# Patient Record
Sex: MALE | Race: WHITE | Employment: FULL TIME | URBAN - METROPOLITAN AREA
[De-identification: names, ages, dates, MRNs, and addresses within clinical notes are randomized per-mention and may not be internally consistent; named-entity substitution may affect disease eponyms.]

---

## 2018-02-06 ENCOUNTER — HOSPITAL ENCOUNTER (OUTPATIENT)
Dept: GENERAL RADIOLOGY | Age: 33
Discharge: HOME | End: 2018-02-06
Attending: OTOLARYNGOLOGY
Payer: COMMERCIAL

## 2018-02-06 DIAGNOSIS — R50.9 FEVER, UNSPECIFIED FEVER CAUSE: ICD-10-CM

## 2018-02-06 DIAGNOSIS — R05.9 COUGH: ICD-10-CM

## 2018-02-06 PROCEDURE — 71046 X-RAY EXAM CHEST 2 VIEWS: CPT | Performed by: OTOLARYNGOLOGY

## 2018-11-16 ENCOUNTER — ORDER TRANSCRIPTION (OUTPATIENT)
Dept: SLEEP CENTER | Age: 33
End: 2018-11-16

## 2018-11-16 DIAGNOSIS — G47.33 OSA (OBSTRUCTIVE SLEEP APNEA): Primary | ICD-10-CM

## 2018-11-27 ENCOUNTER — OFFICE VISIT (OUTPATIENT)
Dept: SLEEP CENTER | Age: 33
End: 2018-11-27
Attending: FAMILY MEDICINE
Payer: COMMERCIAL

## 2018-11-27 DIAGNOSIS — Z76.89 SLEEP CONCERN: Primary | ICD-10-CM

## 2018-11-27 PROCEDURE — 95810 POLYSOM 6/> YRS 4/> PARAM: CPT

## 2018-12-02 NOTE — PROCEDURES
[facility]  Accredited by the [employer] ([employer])    PATIENT'S NAME: Anyi Byrd PHYSICIAN: Gale Rosen MD   REFERRING PHYSICIAN: Gale Rosen MD   PATIENT ACCOUNT #: [de-identified] LOCATION: [facility] Center   [address] [place] [address] respiratory events. PLAN:    1. The patient is an appropriate candidate for CPAP titration, and CPAP titration is advised. 2.   Weight reduction. 3.   Avoidance of respiratory depressants, including sedatives and alcohol near bedtime.   4.   The leonid

## 2018-12-07 ENCOUNTER — ORDER TRANSCRIPTION (OUTPATIENT)
Dept: SLEEP CENTER | Age: 33
End: 2018-12-07

## 2018-12-07 DIAGNOSIS — G47.33 OSA (OBSTRUCTIVE SLEEP APNEA): Primary | ICD-10-CM

## 2018-12-10 ENCOUNTER — OFFICE VISIT (OUTPATIENT)
Dept: SLEEP CENTER | Age: 33
End: 2018-12-10
Attending: FAMILY MEDICINE
Payer: COMMERCIAL

## 2018-12-10 DIAGNOSIS — G47.33 OSA (OBSTRUCTIVE SLEEP APNEA): ICD-10-CM

## 2018-12-10 DIAGNOSIS — Z76.89 SLEEP CONCERN: Primary | ICD-10-CM

## 2018-12-10 PROCEDURE — 95811 POLYSOM 6/>YRS CPAP 4/> PARM: CPT

## 2019-12-19 ENCOUNTER — HOSPITAL ENCOUNTER (OUTPATIENT)
Age: 34
Discharge: HOME | End: 2019-12-19
Payer: COMMERCIAL

## 2019-12-19 VITALS
TEMPERATURE: 98 F | DIASTOLIC BLOOD PRESSURE: 71 MMHG | WEIGHT: 250 LBS | OXYGEN SATURATION: 98 % | RESPIRATION RATE: 18 BRPM | HEART RATE: 98 BPM | SYSTOLIC BLOOD PRESSURE: 143 MMHG

## 2019-12-19 DIAGNOSIS — H65.92 LEFT NON-SUPPURATIVE OTITIS MEDIA: ICD-10-CM

## 2019-12-19 DIAGNOSIS — H60.312 ACUTE DIFFUSE OTITIS EXTERNA OF LEFT EAR: Primary | ICD-10-CM

## 2019-12-19 PROCEDURE — 99213 OFFICE O/P EST LOW 20 MIN: CPT

## 2019-12-19 PROCEDURE — 99204 OFFICE O/P NEW MOD 45 MIN: CPT

## 2019-12-19 RX ORDER — AMOXICILLIN 875 MG/1
875 TABLET, COATED ORAL 2 TIMES DAILY
Qty: 20 TABLET | Refills: 0 | Status: SHIPPED | OUTPATIENT
Start: 2019-12-19 | End: 2019-12-29

## 2019-12-19 RX ORDER — OFLOXACIN 3 MG/ML
10 SOLUTION AURICULAR (OTIC) DAILY
Qty: 1 BOTTLE | Refills: 0 | Status: SHIPPED | OUTPATIENT
Start: 2019-12-19 | End: 2019-12-26

## 2019-12-19 NOTE — ED PROVIDER NOTES
Patient presents with:  Ear Problem Pain      HPI:     Marylee Courser is a 36year old male who presents with a chief complaint of left ear pain that started yesterday. The patient states he has been swimming recently. He denies any drainage from the ear.   Johan Parks Emotionally abused: Not on file        Physically abused: Not on file        Forced sexual activity: Not on file    Other Topics      Concerns:        Not on file    Social History Narrative      Not on file        ROS:   Positive for stated complaint: lef doctor. Diagnosis:    ICD-10-CM    1. Acute diffuse otitis externa of left ear H60.312    2. Left non-suppurative otitis media H65.92        All results reviewed and discussed with patient.   See AVS for detailed discharge instructions for your condition t

## 2020-08-26 ENCOUNTER — LAB ENCOUNTER (OUTPATIENT)
Dept: LAB | Age: 35
End: 2020-08-26
Attending: FAMILY MEDICINE
Payer: COMMERCIAL

## 2020-08-26 DIAGNOSIS — Z00.00 ROUTINE GENERAL MEDICAL EXAMINATION AT A HEALTH CARE FACILITY: Primary | ICD-10-CM

## 2020-08-26 LAB
ALBUMIN SERPL-MCNC: 4.1 G/DL (ref 3.4–5)
ALBUMIN/GLOB SERPL: 0.9 {RATIO} (ref 1–2)
ALP LIVER SERPL-CCNC: 67 U/L (ref 45–117)
ALT SERPL-CCNC: 63 U/L (ref 16–61)
ANION GAP SERPL CALC-SCNC: 6 MMOL/L (ref 0–18)
AST SERPL-CCNC: 37 U/L (ref 15–37)
BASOPHILS # BLD AUTO: 0.04 X10(3) UL (ref 0–0.2)
BASOPHILS NFR BLD AUTO: 0.4 %
BILIRUB SERPL-MCNC: 1.1 MG/DL (ref 0.1–2)
BUN BLD-MCNC: 23 MG/DL (ref 7–18)
BUN/CREAT SERPL: 19.3 (ref 10–20)
CALCIUM BLD-MCNC: 9.5 MG/DL (ref 8.5–10.1)
CHLORIDE SERPL-SCNC: 101 MMOL/L (ref 98–112)
CHOLEST SMN-MCNC: 198 MG/DL (ref ?–200)
CO2 SERPL-SCNC: 27 MMOL/L (ref 21–32)
CREAT BLD-MCNC: 1.19 MG/DL (ref 0.7–1.3)
DEPRECATED RDW RBC AUTO: 39.2 FL (ref 35.1–46.3)
EOSINOPHIL # BLD AUTO: 0.24 X10(3) UL (ref 0–0.7)
EOSINOPHIL NFR BLD AUTO: 2.7 %
ERYTHROCYTE [DISTWIDTH] IN BLOOD BY AUTOMATED COUNT: 13.2 % (ref 11–15)
GLOBULIN PLAS-MCNC: 4.7 G/DL (ref 2.8–4.4)
GLUCOSE BLD-MCNC: 96 MG/DL (ref 70–99)
HCT VFR BLD AUTO: 46.7 % (ref 39–53)
HDLC SERPL-MCNC: 39 MG/DL (ref 40–59)
HGB BLD-MCNC: 15.7 G/DL (ref 13–17.5)
IMM GRANULOCYTES # BLD AUTO: 0.03 X10(3) UL (ref 0–1)
IMM GRANULOCYTES NFR BLD: 0.3 %
LDLC SERPL CALC-MCNC: 89 MG/DL (ref ?–100)
LYMPHOCYTES # BLD AUTO: 3.21 X10(3) UL (ref 1–4)
LYMPHOCYTES NFR BLD AUTO: 35.5 %
M PROTEIN MFR SERPL ELPH: 8.8 G/DL (ref 6.4–8.2)
MCH RBC QN AUTO: 27.8 PG (ref 26–34)
MCHC RBC AUTO-ENTMCNC: 33.6 G/DL (ref 31–37)
MCV RBC AUTO: 82.7 FL (ref 80–100)
MONOCYTES # BLD AUTO: 0.68 X10(3) UL (ref 0.1–1)
MONOCYTES NFR BLD AUTO: 7.5 %
NEUTROPHILS # BLD AUTO: 4.83 X10 (3) UL (ref 1.5–7.7)
NEUTROPHILS # BLD AUTO: 4.83 X10(3) UL (ref 1.5–7.7)
NEUTROPHILS NFR BLD AUTO: 53.6 %
NONHDLC SERPL-MCNC: 159 MG/DL (ref ?–130)
OSMOLALITY SERPL CALC.SUM OF ELEC: 282 MOSM/KG (ref 275–295)
PATIENT FASTING Y/N/NP: YES
PATIENT FASTING Y/N/NP: YES
PLATELET # BLD AUTO: 275 10(3)UL (ref 150–450)
POTASSIUM SERPL-SCNC: 4.2 MMOL/L (ref 3.5–5.1)
RBC # BLD AUTO: 5.65 X10(6)UL (ref 4.3–5.7)
SODIUM SERPL-SCNC: 134 MMOL/L (ref 136–145)
TRIGL SERPL-MCNC: 350 MG/DL (ref 30–149)
TSI SER-ACNC: 3.23 MIU/ML (ref 0.36–3.74)
VLDLC SERPL CALC-MCNC: 70 MG/DL (ref 0–30)
WBC # BLD AUTO: 9 X10(3) UL (ref 4–11)

## 2020-08-26 PROCEDURE — 84443 ASSAY THYROID STIM HORMONE: CPT

## 2020-08-26 PROCEDURE — 80061 LIPID PANEL: CPT

## 2020-08-26 PROCEDURE — 85025 COMPLETE CBC W/AUTO DIFF WBC: CPT

## 2020-08-26 PROCEDURE — 36415 COLL VENOUS BLD VENIPUNCTURE: CPT

## 2020-08-26 PROCEDURE — 80053 COMPREHEN METABOLIC PANEL: CPT

## 2020-10-07 ENCOUNTER — HOSPITAL ENCOUNTER (OUTPATIENT)
Dept: ULTRASOUND IMAGING | Age: 35
Discharge: HOME | End: 2020-10-07
Attending: FAMILY MEDICINE
Payer: COMMERCIAL

## 2020-10-07 DIAGNOSIS — L72.9 CYST OF SKIN: ICD-10-CM

## 2020-10-07 PROCEDURE — 76536 US EXAM OF HEAD AND NECK: CPT | Performed by: FAMILY MEDICINE

## 2021-05-12 ENCOUNTER — HOSPITAL ENCOUNTER (OUTPATIENT)
Age: 36
Discharge: HOME | End: 2021-05-12
Payer: COMMERCIAL

## 2021-05-12 VITALS
OXYGEN SATURATION: 95 % | RESPIRATION RATE: 20 BRPM | HEART RATE: 101 BPM | TEMPERATURE: 97 F | SYSTOLIC BLOOD PRESSURE: 146 MMHG | WEIGHT: 255 LBS | DIASTOLIC BLOOD PRESSURE: 64 MMHG

## 2021-05-12 DIAGNOSIS — J06.9 UPPER RESPIRATORY VIRUS: Primary | ICD-10-CM

## 2021-05-12 PROCEDURE — 99213 OFFICE O/P EST LOW 20 MIN: CPT

## 2021-05-12 PROCEDURE — 87880 STREP A ASSAY W/OPTIC: CPT

## 2021-05-12 RX ORDER — DULOXETIN HYDROCHLORIDE 30 MG/1
30 CAPSULE, DELAYED RELEASE ORAL DAILY
COMMUNITY
Start: 2021-02-05

## 2021-05-12 RX ORDER — PREDNISONE 20 MG/1
60 TABLET ORAL ONCE
Status: COMPLETED | OUTPATIENT
Start: 2021-05-12 | End: 2021-05-12

## 2021-05-12 RX ORDER — PREDNISONE 20 MG/1
40 TABLET ORAL DAILY
Qty: 10 TABLET | Refills: 0 | Status: SHIPPED | OUTPATIENT
Start: 2021-05-12 | End: 2021-05-17

## 2021-05-12 RX ORDER — LISINOPRIL AND HYDROCHLOROTHIAZIDE 25; 20 MG/1; MG/1
1 TABLET ORAL DAILY
COMMUNITY
Start: 2021-02-05

## 2021-05-12 NOTE — ED INITIAL ASSESSMENT (HPI)
Patient with sore throat, congestion, and dry cough. Patient's daughter dx with Strep on Monday. Patient's wife was tested for strep and COVID this week and was negative. Patient is fully vaccinated for COVID.  Patient reports chest tightness and needing to

## 2021-05-12 NOTE — ED PROVIDER NOTES
Patient presents with:  Cough/URI      HPI:     Rigo Trejo is a 30year old male who presents for evaluation and management of a chief complaint of sore throat, cough, and congestion for the past 2 days.   His daughter is currently being treated for s Food in the Last Year:   Transportation Needs:       Lack of Transportation (Medical):       Lack of Transportation (Non-Medical):   Physical Activity:       Days of Exercise per Week:       Minutes of Exercise per Session:   Stress:       Feeling of Stres days.          Dispense:  10 tablet          Refill:  0      Labs performed this visit:  Recent Results (from the past 10 hour(s))   POCT RAPID STREP    Collection Time: 05/12/21 11:35 AM   Result Value Ref Range    POCT Rapid Strep Negative Negative

## 2021-07-13 ENCOUNTER — HOSPITAL ENCOUNTER (OUTPATIENT)
Age: 36
Discharge: HOME | End: 2021-07-13
Payer: COMMERCIAL

## 2021-07-13 VITALS
OXYGEN SATURATION: 97 % | HEART RATE: 99 BPM | DIASTOLIC BLOOD PRESSURE: 68 MMHG | RESPIRATION RATE: 18 BRPM | SYSTOLIC BLOOD PRESSURE: 132 MMHG | TEMPERATURE: 98 F

## 2021-07-13 DIAGNOSIS — J45.909 REACTIVE AIRWAY DISEASE WITHOUT COMPLICATION, UNSPECIFIED ASTHMA SEVERITY, UNSPECIFIED WHETHER PERSISTENT: ICD-10-CM

## 2021-07-13 DIAGNOSIS — B34.9 VIRAL ILLNESS: Primary | ICD-10-CM

## 2021-07-13 PROCEDURE — 99213 OFFICE O/P EST LOW 20 MIN: CPT

## 2021-07-13 RX ORDER — ALBUTEROL SULFATE 90 UG/1
2 AEROSOL, METERED RESPIRATORY (INHALATION) EVERY 4 HOURS PRN
Qty: 6.7 G | Refills: 0 | Status: SHIPPED | OUTPATIENT
Start: 2021-07-13 | End: 2021-08-12

## 2021-07-13 RX ORDER — CODEINE PHOSPHATE AND GUAIFENESIN 10; 100 MG/5ML; MG/5ML
5 SOLUTION ORAL EVERY 6 HOURS PRN
Qty: 118 ML | Refills: 0 | Status: SHIPPED | OUTPATIENT
Start: 2021-07-13

## 2021-07-13 RX ORDER — PREDNISONE 20 MG/1
60 TABLET ORAL DAILY
Qty: 15 TABLET | Refills: 0 | Status: SHIPPED | OUTPATIENT
Start: 2021-07-13 | End: 2021-07-18

## 2021-07-13 RX ORDER — PREDNISONE 20 MG/1
60 TABLET ORAL ONCE
Status: COMPLETED | OUTPATIENT
Start: 2021-07-13 | End: 2021-07-13

## 2021-07-13 NOTE — ED INITIAL ASSESSMENT (HPI)
Patient with nasal congestion cough and sneezing for the last 2-3 days. Denies fevers, chills, body aches. Taking otc cough/colds medications without relief in symptoms.

## 2021-07-13 NOTE — ED PROVIDER NOTES
Patient Seen in: [facility]      History   Patient presents with:  Cough/URI    Stated Complaint: Cough sinus    HPI/Subjective:   Oliver Cortez is a 30year-old male presenting with nasal congestion, cough, chest tightness for the past 2 (Room air)       Current:/68   Pulse 99   Temp 97.8 °F (36.6 °C) (Temporal)   Resp 18   SpO2 97%         Physical Exam  Vitals and nursing note reviewed. Constitutional:       Appearance: Normal appearance.    HENT:      Head: Normocephalic and atra albuterol inhaler. Patient advised to follow-up with his primary care. Go to the ED for new or worsening symptoms. Patient is afebrile, nontoxic in appearance without signs of clinical deterioration.  Patient has no evidence of any emergent medical con

## 2022-02-19 ENCOUNTER — HOSPITAL ENCOUNTER (OUTPATIENT)
Age: 37
Discharge: HOME | End: 2022-02-19
Attending: EMERGENCY MEDICINE
Payer: COMMERCIAL

## 2022-02-19 VITALS
RESPIRATION RATE: 18 BRPM | HEIGHT: 67 IN | SYSTOLIC BLOOD PRESSURE: 146 MMHG | TEMPERATURE: 98 F | OXYGEN SATURATION: 98 % | BODY MASS INDEX: 40.81 KG/M2 | DIASTOLIC BLOOD PRESSURE: 74 MMHG | HEART RATE: 98 BPM | WEIGHT: 260 LBS

## 2022-02-19 DIAGNOSIS — J20.9 BRONCHITIS WITH BRONCHOSPASM: Primary | ICD-10-CM

## 2022-02-19 PROCEDURE — 99213 OFFICE O/P EST LOW 20 MIN: CPT

## 2022-02-19 RX ORDER — ALBUTEROL SULFATE 90 UG/1
2 AEROSOL, METERED RESPIRATORY (INHALATION) EVERY 4 HOURS PRN
Qty: 18 G | Refills: 0 | Status: SHIPPED | OUTPATIENT
Start: 2022-02-19 | End: 2022-03-21

## 2022-02-19 RX ORDER — PREDNISONE 20 MG/1
40 TABLET ORAL DAILY
Qty: 10 TABLET | Refills: 0 | Status: SHIPPED | OUTPATIENT
Start: 2022-02-19 | End: 2022-02-24

## 2022-02-19 RX ORDER — PREDNISONE 20 MG/1
40 TABLET ORAL ONCE
Status: COMPLETED | OUTPATIENT
Start: 2022-02-19 | End: 2022-02-19

## 2022-02-19 NOTE — ED INITIAL ASSESSMENT (HPI)
2 days of congestion and cough. No fever. Presents with chest tightness and wheezing. Declines covid testing.

## 2022-03-14 ENCOUNTER — HOSPITAL ENCOUNTER (OUTPATIENT)
Age: 37
Discharge: HOME | End: 2022-03-14
Payer: COMMERCIAL

## 2022-03-14 VITALS
DIASTOLIC BLOOD PRESSURE: 75 MMHG | OXYGEN SATURATION: 97 % | SYSTOLIC BLOOD PRESSURE: 136 MMHG | HEART RATE: 100 BPM | RESPIRATION RATE: 20 BRPM | TEMPERATURE: 98 F

## 2022-03-14 DIAGNOSIS — J01.90 ACUTE SINUSITIS, RECURRENCE NOT SPECIFIED, UNSPECIFIED LOCATION: Primary | ICD-10-CM

## 2022-03-14 PROCEDURE — 99213 OFFICE O/P EST LOW 20 MIN: CPT

## 2022-03-14 RX ORDER — AMOXICILLIN AND CLAVULANATE POTASSIUM 875; 125 MG/1; MG/1
1 TABLET, FILM COATED ORAL 2 TIMES DAILY
Qty: 20 TABLET | Refills: 0 | Status: SHIPPED | OUTPATIENT
Start: 2022-03-14 | End: 2022-03-24

## 2022-03-14 NOTE — ED INITIAL ASSESSMENT (HPI)
PATIENT ARRIVED AMBULATORY TO ROOM. PATIENT STATES HE WAS SEEN IN THE IC ON 2/19 FOR SIMILAR SYMPTOMS. PATIENT STATES THE NASAL CONGESTION HAS STILL NOT RESOLVED. NOW C/O BILATERAL EAR PAIN. THE LEFT IS WORSE THAN THE RIGHT. NO FEVERS.

## 2022-03-23 ENCOUNTER — ORDER TRANSCRIPTION (OUTPATIENT)
Dept: ADMINISTRATIVE | Facility: HOSPITAL | Age: 37
End: 2022-03-23

## 2022-03-26 ENCOUNTER — HOSPITAL ENCOUNTER (OUTPATIENT)
Age: 37
Discharge: HOME | End: 2022-03-26
Payer: COMMERCIAL

## 2022-03-26 ENCOUNTER — APPOINTMENT (OUTPATIENT)
Dept: ULTRASOUND IMAGING | Age: 37
End: 2022-03-26
Attending: NURSE PRACTITIONER
Payer: COMMERCIAL

## 2022-03-26 ENCOUNTER — APPOINTMENT (OUTPATIENT)
Dept: GENERAL RADIOLOGY | Age: 37
End: 2022-03-26
Attending: NURSE PRACTITIONER
Payer: COMMERCIAL

## 2022-03-26 VITALS
RESPIRATION RATE: 18 BRPM | WEIGHT: 260 LBS | HEIGHT: 67 IN | SYSTOLIC BLOOD PRESSURE: 156 MMHG | DIASTOLIC BLOOD PRESSURE: 66 MMHG | OXYGEN SATURATION: 95 % | HEART RATE: 108 BPM | BODY MASS INDEX: 40.81 KG/M2 | TEMPERATURE: 98 F

## 2022-03-26 DIAGNOSIS — M79.672 LEFT FOOT PAIN: Primary | ICD-10-CM

## 2022-03-26 PROCEDURE — 73630 X-RAY EXAM OF FOOT: CPT | Performed by: NURSE PRACTITIONER

## 2022-03-26 PROCEDURE — 93971 EXTREMITY STUDY: CPT | Performed by: NURSE PRACTITIONER

## 2022-03-26 PROCEDURE — 99214 OFFICE O/P EST MOD 30 MIN: CPT

## 2022-03-26 PROCEDURE — 99213 OFFICE O/P EST LOW 20 MIN: CPT

## 2022-03-26 RX ORDER — TRAMADOL HYDROCHLORIDE 50 MG/1
TABLET ORAL EVERY 6 HOURS PRN
Qty: 10 TABLET | Refills: 0 | Status: SHIPPED | OUTPATIENT
Start: 2022-03-26 | End: 2022-03-31

## 2022-03-26 RX ORDER — IBUPROFEN 400 MG/1
800 TABLET ORAL ONCE
Status: DISCONTINUED | OUTPATIENT
Start: 2022-03-26 | End: 2022-03-26

## 2022-03-26 NOTE — ED INITIAL ASSESSMENT (HPI)
Pt presents with Left foot pain x 48 hours. No trauma, no injury. Pain to dorsal aspect of foot into toes. Pt took Motrin today at 730a    Pt seen here 10 days ago for left ear pain, requesting left ear to be re-evaluated.

## 2022-06-01 ENCOUNTER — HOSPITAL ENCOUNTER (OUTPATIENT)
Age: 37
Discharge: HOME | End: 2022-06-01
Attending: EMERGENCY MEDICINE
Payer: COMMERCIAL

## 2022-06-01 VITALS
SYSTOLIC BLOOD PRESSURE: 126 MMHG | DIASTOLIC BLOOD PRESSURE: 75 MMHG | HEART RATE: 102 BPM | OXYGEN SATURATION: 96 % | TEMPERATURE: 98 F | RESPIRATION RATE: 24 BRPM

## 2022-06-01 DIAGNOSIS — J20.9 BRONCHITIS WITH BRONCHOSPASM: Primary | ICD-10-CM

## 2022-06-01 LAB — SARS-COV-2 RNA RESP QL NAA+PROBE: NOT DETECTED

## 2022-06-01 PROCEDURE — 99213 OFFICE O/P EST LOW 20 MIN: CPT

## 2022-06-01 RX ORDER — PREDNISONE 20 MG/1
40 TABLET ORAL ONCE
Status: COMPLETED | OUTPATIENT
Start: 2022-06-01 | End: 2022-06-01

## 2022-06-01 RX ORDER — PREDNISONE 20 MG/1
40 TABLET ORAL DAILY
Qty: 10 TABLET | Refills: 0 | Status: SHIPPED | OUTPATIENT
Start: 2022-06-01 | End: 2022-06-06

## 2022-06-20 ENCOUNTER — OFFICE VISIT (OUTPATIENT)
Dept: SURGERY | Facility: CLINIC | Age: 37
End: 2022-06-20
Payer: COMMERCIAL

## 2022-06-20 VITALS
DIASTOLIC BLOOD PRESSURE: 80 MMHG | WEIGHT: 277.19 LBS | HEIGHT: 68.3 IN | HEART RATE: 104 BPM | OXYGEN SATURATION: 97 % | BODY MASS INDEX: 42.01 KG/M2 | SYSTOLIC BLOOD PRESSURE: 132 MMHG

## 2022-06-20 DIAGNOSIS — E66.01 MORBID OBESITY WITH BMI OF 40.0-44.9, ADULT (HCC): ICD-10-CM

## 2022-06-20 DIAGNOSIS — G47.33 OSA ON CPAP: Primary | ICD-10-CM

## 2022-06-20 DIAGNOSIS — F41.9 ANXIETY: ICD-10-CM

## 2022-06-20 DIAGNOSIS — I10 HYPERTENSION, UNSPECIFIED TYPE: ICD-10-CM

## 2022-06-20 DIAGNOSIS — R74.01 ELEVATED ALT MEASUREMENT: ICD-10-CM

## 2022-06-20 DIAGNOSIS — Z99.89 OSA ON CPAP: Primary | ICD-10-CM

## 2022-06-20 DIAGNOSIS — E78.2 HYPERCHOLESTEROLEMIA WITH HYPERTRIGLYCERIDEMIA: ICD-10-CM

## 2022-06-20 DIAGNOSIS — R63.5 WEIGHT GAIN: ICD-10-CM

## 2022-06-20 PROCEDURE — 3075F SYST BP GE 130 - 139MM HG: CPT | Performed by: NURSE PRACTITIONER

## 2022-06-20 PROCEDURE — 3008F BODY MASS INDEX DOCD: CPT | Performed by: NURSE PRACTITIONER

## 2022-06-20 PROCEDURE — 3079F DIAST BP 80-89 MM HG: CPT | Performed by: NURSE PRACTITIONER

## 2022-06-20 PROCEDURE — 99204 OFFICE O/P NEW MOD 45 MIN: CPT | Performed by: NURSE PRACTITIONER

## 2022-06-20 RX ORDER — ALPRAZOLAM 0.5 MG/1
0.5 TABLET ORAL DAILY PRN
COMMUNITY
Start: 2022-04-18

## 2022-06-20 RX ORDER — ALBUTEROL SULFATE 90 UG/1
AEROSOL, METERED RESPIRATORY (INHALATION) EVERY 6 HOURS PRN
COMMUNITY

## 2022-06-20 RX ORDER — TOPIRAMATE 25 MG/1
25 TABLET ORAL 2 TIMES DAILY
Qty: 60 TABLET | Refills: 1 | Status: SHIPPED | OUTPATIENT
Start: 2022-06-20

## 2022-06-20 NOTE — PATIENT INSTRUCTIONS
Proceed with stress test planned in July. Continue CBT: Binge Eating. Start 25 mg topiramate in the evening for cravings and to assist weight loss, increase to twice a day as tolerated. Daily Calories:  Track food and beverage intake daily on a phone cheo: My Fitness Pal, Carb Manager, Lose It, My Net Diary   120-174 lbs: 1200 calories/day. 175-219 lbs: 1500 calories/day. 220-249 lbs: 1800 calories/day. 250 lbs or more: 2,000 calories/day. Record food and drink intake on a written log or phone cheo:   Aim for  grams of carbs/day. Aim for a whole, plant strong, low glycemic index dietary pattern. Aim for protein + produce at each meal time. Keep meals between 7 am and 7 pm.    Aim for 3 healthy meals a day with 1-2 healthy snacks as needed. Daily- Aim for:  2 fruits: tomato, avocado, apples, oranges, berries   4 handfuls non-starchy vegetables: greens, peppers, onion, garlic, broccoli, cauliflower, asparagus, brussels sprouts   2 starches: real potato (sweet, white), green beans, carrots, corn, beans, lentils, chickpeas, quinoa, cous cous  3 protein per day: nuts, seeds, plants (lentils, chickpeas, beans), chicken, fish, seafood, turkey, pork, red meat (limited)  Aim for 2 servings healthy fats per day: olives, fatty fish, olive oil, seeds, nuts, avocado, coconut oil. Breakfast ideas:  1. Fruit and nuts/seeds. 2. Eggs scrambled with vegetables, cottage cheese. 3. Oatmeal (stovetop), cinnamon, 2 tbsp flaxseed, berries. 4. Protein shake: 1-2 scoops protein powder, shake with ice and water. Aim for protein and vegetables for lunch and dinner. Healthy Plate method:   1/2 vegetables, 1/4 protein, 1/4 healthy starch (may decrease this portion). Snacks:  Raw vegetables and hummus, apples and peanut butter, nuts, seeds, fruit, pecans with organic honey drizzled. 1. Do a house cleanse, remove unhealthy foods from the house.    2. Aim to eat a whole, plant strong, low glycemic index diet. 3. Focus on the quality of your diet. 4. Get in the habit of recording everything you eat and drink using a food log.  5. Write down all your meals/drinks to stay accountable for what you eat and drink. 6. Find a regular pattern for meals. Aim for 3 healthy meals a day with 1-2 healthy snacks. 7. Plan when, where, and what you will eat at each meal in advance to make sure you do not go too many hours without eating. 8. Include lean protein throughout the day to help steady your appetite. 9. Eat at least 4 servings of vegetables and 2 servings for fruits each day. 10. Add fiber to slow down digestion and keep you full longer. 11. Cut out sugar sweetened beverages. 12. Avoid highly processed carbohydrates. 13. Use the healthy plate method as a reference: Lunch & Dinner plate: 1/2 vegetables (and some fruit), 1/4 protein, 1/4 healthy starch (may decrease this portion). 14. Aim to lose 1 to 2 lbs per week. 15. Become more physically active. Aim for 150 minutes of moderate/vigorous activity per week. 16. Aim to sleep 7-8 hours per night. 17. Stress less. Meditate. 25. Connect with others, loneliness can contribute to disease. 19. Aim to drink at least 64 oz of water a day, you can increase this to half your body weight in ounces/day. Get labs. Meet with dietician. Return to clinic in 6 to 8 weeks.

## 2022-06-21 ENCOUNTER — HOSPITAL ENCOUNTER (OUTPATIENT)
Age: 37
Discharge: HOME | End: 2022-06-21
Payer: COMMERCIAL

## 2022-06-21 VITALS
OXYGEN SATURATION: 95 % | RESPIRATION RATE: 22 BRPM | BODY MASS INDEX: 41.68 KG/M2 | SYSTOLIC BLOOD PRESSURE: 136 MMHG | WEIGHT: 275 LBS | DIASTOLIC BLOOD PRESSURE: 68 MMHG | TEMPERATURE: 99 F | HEART RATE: 110 BPM | HEIGHT: 68 IN

## 2022-06-21 DIAGNOSIS — U07.1 COVID-19 VIRUS INFECTION: Primary | ICD-10-CM

## 2022-06-21 PROCEDURE — 99213 OFFICE O/P EST LOW 20 MIN: CPT

## 2022-06-21 RX ORDER — NIRMATRELVIR AND RITONAVIR 300-100 MG
KIT ORAL
Qty: 30 TABLET | Refills: 0 | Status: SHIPPED | OUTPATIENT
Start: 2022-06-21 | End: 2022-06-21

## 2022-06-21 RX ORDER — ALBUTEROL SULFATE 90 UG/1
2 AEROSOL, METERED RESPIRATORY (INHALATION) EVERY 4 HOURS PRN
Qty: 1 EACH | Refills: 0 | Status: SHIPPED | OUTPATIENT
Start: 2022-06-21 | End: 2022-07-21

## 2022-06-21 RX ORDER — NIRMATRELVIR AND RITONAVIR 300-100 MG
KIT ORAL
Qty: 30 TABLET | Refills: 0 | Status: SHIPPED | OUTPATIENT
Start: 2022-06-21 | End: 2022-06-26

## 2022-06-21 RX ORDER — BENZONATATE 100 MG/1
100 CAPSULE ORAL 3 TIMES DAILY PRN
Qty: 30 CAPSULE | Refills: 0 | Status: SHIPPED | OUTPATIENT
Start: 2022-06-21 | End: 2022-07-21

## 2022-06-21 RX ORDER — CODEINE PHOSPHATE AND GUAIFENESIN 10; 100 MG/5ML; MG/5ML
5 SOLUTION ORAL EVERY 6 HOURS PRN
Qty: 50 ML | Refills: 0 | Status: SHIPPED | OUTPATIENT
Start: 2022-06-21

## 2022-06-21 NOTE — ED INITIAL ASSESSMENT (HPI)
Pt covid +, c/o cough x days, fever and SOB x 1 day. Pt had covid PCR testing from outside facility, results visualized.

## 2022-07-12 ENCOUNTER — TELEPHONE (OUTPATIENT)
Dept: SURGERY | Facility: CLINIC | Age: 37
End: 2022-07-12

## 2022-07-12 ENCOUNTER — LAB ENCOUNTER (OUTPATIENT)
Dept: LAB | Age: 37
End: 2022-07-12
Attending: NURSE PRACTITIONER
Payer: COMMERCIAL

## 2022-07-12 DIAGNOSIS — Z99.89 OSA ON CPAP: ICD-10-CM

## 2022-07-12 DIAGNOSIS — G47.33 OSA ON CPAP: ICD-10-CM

## 2022-07-12 DIAGNOSIS — E78.2 HYPERCHOLESTEROLEMIA WITH HYPERTRIGLYCERIDEMIA: ICD-10-CM

## 2022-07-12 DIAGNOSIS — I10 HYPERTENSION, UNSPECIFIED TYPE: ICD-10-CM

## 2022-07-12 DIAGNOSIS — R63.5 WEIGHT GAIN: ICD-10-CM

## 2022-07-12 DIAGNOSIS — R74.01 ELEVATED ALT MEASUREMENT: ICD-10-CM

## 2022-07-12 DIAGNOSIS — F41.9 ANXIETY: ICD-10-CM

## 2022-07-12 DIAGNOSIS — E66.01 MORBID OBESITY WITH BMI OF 40.0-44.9, ADULT (HCC): ICD-10-CM

## 2022-07-12 LAB
ALBUMIN SERPL-MCNC: 3.9 G/DL (ref 3.4–5)
ALBUMIN/GLOB SERPL: 1 {RATIO} (ref 1–2)
ALP LIVER SERPL-CCNC: 62 U/L
ALT SERPL-CCNC: 126 U/L
ANION GAP SERPL CALC-SCNC: 6 MMOL/L (ref 0–18)
AST SERPL-CCNC: 55 U/L (ref 15–37)
BILIRUB SERPL-MCNC: 1.2 MG/DL (ref 0.1–2)
BUN BLD-MCNC: 21 MG/DL (ref 7–18)
BUN/CREAT SERPL: 19.8 (ref 10–20)
CALCIUM BLD-MCNC: 9.7 MG/DL (ref 8.5–10.1)
CHLORIDE SERPL-SCNC: 101 MMOL/L (ref 98–112)
CHOLEST SERPL-MCNC: 187 MG/DL (ref ?–200)
CO2 SERPL-SCNC: 27 MMOL/L (ref 21–32)
CREAT BLD-MCNC: 1.06 MG/DL
EST. AVERAGE GLUCOSE BLD GHB EST-MCNC: 154 MG/DL (ref 68–126)
FASTING PATIENT LIPID ANSWER: YES
FASTING STATUS PATIENT QL REPORTED: YES
GLOBULIN PLAS-MCNC: 3.9 G/DL (ref 2.8–4.4)
GLUCOSE BLD-MCNC: 133 MG/DL (ref 70–99)
HBA1C MFR BLD: 7 % (ref ?–5.7)
HDLC SERPL-MCNC: 38 MG/DL (ref 40–59)
LDLC SERPL CALC-MCNC: 101 MG/DL (ref ?–100)
NONHDLC SERPL-MCNC: 149 MG/DL (ref ?–130)
OSMOLALITY SERPL CALC.SUM OF ELEC: 283 MOSM/KG (ref 275–295)
POTASSIUM SERPL-SCNC: 4 MMOL/L (ref 3.5–5.1)
PROT SERPL-MCNC: 7.8 G/DL (ref 6.4–8.2)
SODIUM SERPL-SCNC: 134 MMOL/L (ref 136–145)
T4 FREE SERPL-MCNC: 1.1 NG/DL (ref 0.8–1.7)
TRIGL SERPL-MCNC: 281 MG/DL (ref 30–149)
TSI SER-ACNC: 2.94 MIU/ML (ref 0.36–3.74)
VIT B12 SERPL-MCNC: 700 PG/ML (ref 193–986)
VIT D+METAB SERPL-MCNC: 19.6 NG/ML (ref 30–100)
VLDLC SERPL CALC-MCNC: 47 MG/DL (ref 0–30)

## 2022-07-12 PROCEDURE — 84439 ASSAY OF FREE THYROXINE: CPT

## 2022-07-12 PROCEDURE — 83036 HEMOGLOBIN GLYCOSYLATED A1C: CPT

## 2022-07-12 PROCEDURE — 36415 COLL VENOUS BLD VENIPUNCTURE: CPT

## 2022-07-12 PROCEDURE — 80061 LIPID PANEL: CPT

## 2022-07-12 PROCEDURE — 84443 ASSAY THYROID STIM HORMONE: CPT

## 2022-07-12 PROCEDURE — 80053 COMPREHEN METABOLIC PANEL: CPT

## 2022-07-12 PROCEDURE — 82607 VITAMIN B-12: CPT

## 2022-07-12 PROCEDURE — 82306 VITAMIN D 25 HYDROXY: CPT

## 2022-07-12 PROCEDURE — 82397 CHEMILUMINESCENT ASSAY: CPT

## 2022-07-12 NOTE — TELEPHONE ENCOUNTER
Mira Ferrari, Patient called today to request to speak with you regarding blood work results. Patient stated that he has bad anxiety and he is receiving notifications on [employer] but does not want to look at them until you discuss them with him over the phone please. I did inform patient that you are out of office unit tomorrow.

## 2022-07-13 ENCOUNTER — TELEPHONE (OUTPATIENT)
Dept: SURGERY | Facility: CLINIC | Age: 37
End: 2022-07-13

## 2022-07-13 DIAGNOSIS — Z99.89 OSA ON CPAP: ICD-10-CM

## 2022-07-13 DIAGNOSIS — R73.03 BORDERLINE DIABETES: ICD-10-CM

## 2022-07-13 DIAGNOSIS — E66.01 MORBID OBESITY WITH BMI OF 40.0-44.9, ADULT (HCC): ICD-10-CM

## 2022-07-13 DIAGNOSIS — R74.01 ELEVATED ALT MEASUREMENT: ICD-10-CM

## 2022-07-13 DIAGNOSIS — I10 HYPERTENSION, UNSPECIFIED TYPE: ICD-10-CM

## 2022-07-13 DIAGNOSIS — R63.5 WEIGHT GAIN: ICD-10-CM

## 2022-07-13 DIAGNOSIS — R79.89 ELEVATED LFTS: Primary | ICD-10-CM

## 2022-07-13 DIAGNOSIS — E78.2 HYPERCHOLESTEROLEMIA WITH HYPERTRIGLYCERIDEMIA: ICD-10-CM

## 2022-07-13 DIAGNOSIS — G47.33 OSA ON CPAP: ICD-10-CM

## 2022-07-13 NOTE — TELEPHONE ENCOUNTER
Patient reports he received VM from pcp, dx diabetes, a1c 7.0. Planning to start metformin. Has lost 8 lbs. Explained we will consider ozempic next visit. Explained Vitamin D level is mildly low at 19.6, start 4000 IU vitamin d3 capsule daily. LFTs further elevated, recent COVID though. Plan to recheck 1 month. Triglycerides improving. LDL mildly elevated. Continue weight loss efforts. Start metformin. Explained other labs show no sig abnormality: B 12, thyroid studies. Leptin pending- will message with final result. Planning meet RD. RTC as planned. All questions answered.

## 2022-07-14 LAB — LEPTIN: 18.9 NG/ML

## 2022-07-19 ENCOUNTER — LAB ENCOUNTER (OUTPATIENT)
Dept: LAB | Age: 37
End: 2022-07-19
Attending: FAMILY MEDICINE
Payer: COMMERCIAL

## 2022-07-19 DIAGNOSIS — Z11.59 ENCOUNTER FOR SCREENING FOR OTHER VIRAL DISEASES: ICD-10-CM

## 2022-07-19 DIAGNOSIS — Z01.818 PREOP EXAMINATION: ICD-10-CM

## 2022-07-20 LAB — SARS-COV-2 RNA RESP QL NAA+PROBE: NOT DETECTED

## 2022-07-22 ENCOUNTER — HOSPITAL ENCOUNTER (OUTPATIENT)
Dept: CV DIAGNOSTICS | Facility: HOSPITAL | Age: 37
Discharge: HOME | End: 2022-07-22
Attending: FAMILY MEDICINE
Payer: COMMERCIAL

## 2022-07-22 DIAGNOSIS — R07.89 ATYPICAL CHEST PAIN: ICD-10-CM

## 2022-07-22 PROCEDURE — 93017 CV STRESS TEST TRACING ONLY: CPT | Performed by: FAMILY MEDICINE

## 2022-07-22 PROCEDURE — 93018 CV STRESS TEST I&R ONLY: CPT | Performed by: FAMILY MEDICINE

## 2022-08-29 ENCOUNTER — LAB ENCOUNTER (OUTPATIENT)
Dept: LAB | Facility: HOSPITAL | Age: 37
End: 2022-08-29
Attending: NURSE PRACTITIONER
Payer: COMMERCIAL

## 2022-08-29 ENCOUNTER — OFFICE VISIT (OUTPATIENT)
Dept: SURGERY | Facility: CLINIC | Age: 37
End: 2022-08-29
Payer: COMMERCIAL

## 2022-08-29 VITALS
SYSTOLIC BLOOD PRESSURE: 126 MMHG | OXYGEN SATURATION: 97 % | DIASTOLIC BLOOD PRESSURE: 64 MMHG | HEIGHT: 68.3 IN | WEIGHT: 247.13 LBS | BODY MASS INDEX: 37.46 KG/M2 | HEART RATE: 81 BPM

## 2022-08-29 DIAGNOSIS — E78.2 HYPERCHOLESTEROLEMIA WITH HYPERTRIGLYCERIDEMIA: ICD-10-CM

## 2022-08-29 DIAGNOSIS — I10 HYPERTENSION, UNSPECIFIED TYPE: ICD-10-CM

## 2022-08-29 DIAGNOSIS — G47.33 OSA ON CPAP: Primary | ICD-10-CM

## 2022-08-29 DIAGNOSIS — Z99.89 OSA ON CPAP: ICD-10-CM

## 2022-08-29 DIAGNOSIS — R74.01 ELEVATED ALT MEASUREMENT: ICD-10-CM

## 2022-08-29 DIAGNOSIS — G47.33 OSA ON CPAP: ICD-10-CM

## 2022-08-29 DIAGNOSIS — F41.9 ANXIETY: ICD-10-CM

## 2022-08-29 DIAGNOSIS — E66.9 OBESITY (BMI 30-39.9): ICD-10-CM

## 2022-08-29 DIAGNOSIS — Z99.89 OSA ON CPAP: Primary | ICD-10-CM

## 2022-08-29 LAB
ALBUMIN SERPL-MCNC: 4 G/DL (ref 3.4–5)
ALBUMIN/GLOB SERPL: 0.9 {RATIO} (ref 1–2)
ALP LIVER SERPL-CCNC: 68 U/L
ALT SERPL-CCNC: 71 U/L
ANION GAP SERPL CALC-SCNC: 11 MMOL/L (ref 0–18)
AST SERPL-CCNC: 45 U/L (ref 15–37)
BILIRUB SERPL-MCNC: 0.9 MG/DL (ref 0.1–2)
BUN BLD-MCNC: 26 MG/DL (ref 7–18)
BUN/CREAT SERPL: 26 (ref 10–20)
CALCIUM BLD-MCNC: 9.6 MG/DL (ref 8.5–10.1)
CHLORIDE SERPL-SCNC: 106 MMOL/L (ref 98–112)
CO2 SERPL-SCNC: 18 MMOL/L (ref 21–32)
CREAT BLD-MCNC: 1 MG/DL
FASTING STATUS PATIENT QL REPORTED: YES
GFR SERPLBLD BASED ON 1.73 SQ M-ARVRAT: 100 ML/MIN/1.73M2 (ref 60–?)
GLOBULIN PLAS-MCNC: 4.6 G/DL (ref 2.8–4.4)
GLUCOSE BLD-MCNC: 80 MG/DL (ref 70–99)
OSMOLALITY SERPL CALC.SUM OF ELEC: 284 MOSM/KG (ref 275–295)
POTASSIUM SERPL-SCNC: 4.8 MMOL/L (ref 3.5–5.1)
PROT SERPL-MCNC: 8.6 G/DL (ref 6.4–8.2)
SODIUM SERPL-SCNC: 135 MMOL/L (ref 136–145)

## 2022-08-29 PROCEDURE — 3074F SYST BP LT 130 MM HG: CPT | Performed by: NURSE PRACTITIONER

## 2022-08-29 PROCEDURE — 99214 OFFICE O/P EST MOD 30 MIN: CPT | Performed by: NURSE PRACTITIONER

## 2022-08-29 PROCEDURE — 3078F DIAST BP <80 MM HG: CPT | Performed by: NURSE PRACTITIONER

## 2022-08-29 PROCEDURE — 3008F BODY MASS INDEX DOCD: CPT | Performed by: NURSE PRACTITIONER

## 2022-08-29 PROCEDURE — 36415 COLL VENOUS BLD VENIPUNCTURE: CPT

## 2022-08-29 PROCEDURE — 80053 COMPREHEN METABOLIC PANEL: CPT

## 2022-08-29 RX ORDER — TOPIRAMATE 25 MG/1
25 TABLET ORAL 2 TIMES DAILY
Qty: 180 TABLET | Refills: 0 | Status: SHIPPED | OUTPATIENT
Start: 2022-08-29

## 2022-08-31 ENCOUNTER — TELEPHONE (OUTPATIENT)
Dept: SURGERY | Facility: CLINIC | Age: 37
End: 2022-08-31

## 2022-09-07 ENCOUNTER — OFFICE VISIT (OUTPATIENT)
Dept: SURGERY | Facility: CLINIC | Age: 37
End: 2022-09-07
Payer: COMMERCIAL

## 2022-09-07 VITALS — BODY MASS INDEX: 37.76 KG/M2 | HEIGHT: 68.3 IN | WEIGHT: 249.13 LBS

## 2022-09-07 DIAGNOSIS — R74.01 ELEVATED ALT MEASUREMENT: ICD-10-CM

## 2022-09-07 DIAGNOSIS — Z99.89 OSA ON CPAP: ICD-10-CM

## 2022-09-07 DIAGNOSIS — E66.01 MORBID OBESITY WITH BMI OF 40.0-44.9, ADULT (HCC): ICD-10-CM

## 2022-09-07 DIAGNOSIS — I10 HYPERTENSION, UNSPECIFIED TYPE: ICD-10-CM

## 2022-09-07 DIAGNOSIS — R63.5 WEIGHT GAIN: ICD-10-CM

## 2022-09-07 DIAGNOSIS — G47.33 OSA ON CPAP: ICD-10-CM

## 2022-09-07 DIAGNOSIS — F41.9 ANXIETY: ICD-10-CM

## 2022-09-07 DIAGNOSIS — E78.2 HYPERCHOLESTEROLEMIA WITH HYPERTRIGLYCERIDEMIA: ICD-10-CM

## 2022-09-07 DIAGNOSIS — E66.9 OBESITY (BMI 30-39.9): ICD-10-CM

## 2022-09-07 PROCEDURE — 3008F BODY MASS INDEX DOCD: CPT

## 2022-09-07 PROCEDURE — 97802 MEDICAL NUTRITION INDIV IN: CPT

## 2023-02-28 ENCOUNTER — LAB ENCOUNTER (OUTPATIENT)
Dept: LAB | Age: 38
End: 2023-02-28
Attending: FAMILY MEDICINE
Payer: COMMERCIAL

## 2023-02-28 DIAGNOSIS — E11.9 DIABETES MELLITUS (HCC): Primary | ICD-10-CM

## 2023-02-28 DIAGNOSIS — I10 ESSENTIAL HYPERTENSION, MALIGNANT: ICD-10-CM

## 2023-02-28 LAB
ALBUMIN SERPL-MCNC: 4.3 G/DL (ref 3.4–5)
ALBUMIN/GLOB SERPL: 1.1 {RATIO} (ref 1–2)
ALP LIVER SERPL-CCNC: 63 U/L
ALT SERPL-CCNC: 45 U/L
ANION GAP SERPL CALC-SCNC: 4 MMOL/L (ref 0–18)
AST SERPL-CCNC: 25 U/L (ref 15–37)
BILIRUB SERPL-MCNC: 1.1 MG/DL (ref 0.1–2)
BUN BLD-MCNC: 19 MG/DL (ref 7–18)
BUN/CREAT SERPL: 17.6 (ref 10–20)
CALCIUM BLD-MCNC: 9.9 MG/DL (ref 8.5–10.1)
CHLORIDE SERPL-SCNC: 109 MMOL/L (ref 98–112)
CHOLEST SERPL-MCNC: 146 MG/DL (ref ?–200)
CO2 SERPL-SCNC: 26 MMOL/L (ref 21–32)
CREAT BLD-MCNC: 1.08 MG/DL
EST. AVERAGE GLUCOSE BLD GHB EST-MCNC: 114 MG/DL (ref 68–126)
FASTING PATIENT LIPID ANSWER: YES
FASTING STATUS PATIENT QL REPORTED: YES
GFR SERPLBLD BASED ON 1.73 SQ M-ARVRAT: 91 ML/MIN/1.73M2 (ref 60–?)
GLOBULIN PLAS-MCNC: 3.9 G/DL (ref 2.8–4.4)
GLUCOSE BLD-MCNC: 112 MG/DL (ref 70–99)
HBA1C MFR BLD: 5.6 % (ref ?–5.7)
HDLC SERPL-MCNC: 38 MG/DL (ref 40–59)
LDLC SERPL CALC-MCNC: 81 MG/DL (ref ?–100)
NONHDLC SERPL-MCNC: 108 MG/DL (ref ?–130)
OSMOLALITY SERPL CALC.SUM OF ELEC: 291 MOSM/KG (ref 275–295)
POTASSIUM SERPL-SCNC: 4 MMOL/L (ref 3.5–5.1)
PROT SERPL-MCNC: 8.2 G/DL (ref 6.4–8.2)
SODIUM SERPL-SCNC: 139 MMOL/L (ref 136–145)
TRIGL SERPL-MCNC: 155 MG/DL (ref 30–149)
VLDLC SERPL CALC-MCNC: 24 MG/DL (ref 0–30)

## 2023-02-28 PROCEDURE — 83036 HEMOGLOBIN GLYCOSYLATED A1C: CPT

## 2023-02-28 PROCEDURE — 80053 COMPREHEN METABOLIC PANEL: CPT

## 2023-02-28 PROCEDURE — 80061 LIPID PANEL: CPT

## 2023-02-28 PROCEDURE — 36415 COLL VENOUS BLD VENIPUNCTURE: CPT

## 2023-05-22 ENCOUNTER — HOSPITAL ENCOUNTER (OUTPATIENT)
Age: 38
Discharge: HOME | End: 2023-05-22
Attending: EMERGENCY MEDICINE
Payer: COMMERCIAL

## 2023-05-22 VITALS
HEART RATE: 94 BPM | RESPIRATION RATE: 20 BRPM | TEMPERATURE: 98 F | DIASTOLIC BLOOD PRESSURE: 69 MMHG | SYSTOLIC BLOOD PRESSURE: 132 MMHG | OXYGEN SATURATION: 99 %

## 2023-05-22 DIAGNOSIS — J45.901 ALLERGIC BRONCHITIS WITH ACUTE EXACERBATION: Primary | ICD-10-CM

## 2023-05-22 PROCEDURE — 99213 OFFICE O/P EST LOW 20 MIN: CPT

## 2023-05-22 PROCEDURE — 99214 OFFICE O/P EST MOD 30 MIN: CPT

## 2023-05-22 RX ORDER — PREDNISONE 20 MG/1
40 TABLET ORAL DAILY
Qty: 10 TABLET | Refills: 0 | Status: SHIPPED | OUTPATIENT
Start: 2023-05-22 | End: 2023-05-27

## 2023-05-22 RX ORDER — GUAIFENESIN AND CODEINE PHOSPHATE 100; 10 MG/5ML; MG/5ML
5 SOLUTION ORAL EVERY 8 HOURS PRN
Qty: 118 ML | Refills: 0 | Status: SHIPPED | OUTPATIENT
Start: 2023-05-22 | End: 2023-06-05

## 2023-07-06 ENCOUNTER — APPOINTMENT (OUTPATIENT)
Dept: URBAN - METROPOLITAN AREA CLINIC 244 | Age: 38
Setting detail: DERMATOLOGY
End: 2023-07-06

## 2023-07-06 DIAGNOSIS — L81.4 OTHER MELANIN HYPERPIGMENTATION: ICD-10-CM

## 2023-07-06 DIAGNOSIS — L738 OTHER SPECIFIED DISEASES OF HAIR AND HAIR FOLLICLES: ICD-10-CM

## 2023-07-06 DIAGNOSIS — D22 MELANOCYTIC NEVI: ICD-10-CM

## 2023-07-06 DIAGNOSIS — L71.8 OTHER ROSACEA: ICD-10-CM

## 2023-07-06 DIAGNOSIS — L663 OTHER SPECIFIED DISEASES OF HAIR AND HAIR FOLLICLES: ICD-10-CM

## 2023-07-06 DIAGNOSIS — L82.1 OTHER SEBORRHEIC KERATOSIS: ICD-10-CM

## 2023-07-06 PROBLEM — L02.12 FURUNCLE OF NECK: Status: ACTIVE | Noted: 2023-07-06

## 2023-07-06 PROBLEM — D22.5 MELANOCYTIC NEVI OF TRUNK: Status: ACTIVE | Noted: 2023-07-06

## 2023-07-06 PROCEDURE — 99214 OFFICE O/P EST MOD 30 MIN: CPT

## 2023-07-06 PROCEDURE — OTHER PRESCRIPTION: OTHER

## 2023-07-06 PROCEDURE — OTHER ADDITIONAL NOTES: OTHER

## 2023-07-06 PROCEDURE — OTHER COUNSELING: OTHER

## 2023-07-06 RX ORDER — FLUOCINONIDE 0.5 MG/ML
SOLUTION TOPICAL
Qty: 60 | Refills: 10 | Status: ERX | COMMUNITY
Start: 2023-07-06

## 2023-07-06 RX ORDER — CLINDAMYCIN PHOSPHATE 10 MG/ML
SOLUTION TOPICAL
Qty: 60 | Refills: 10 | Status: ERX | COMMUNITY
Start: 2023-07-06

## 2023-07-06 ASSESSMENT — LOCATION ZONE DERM
LOCATION ZONE: NECK
LOCATION ZONE: FACE
LOCATION ZONE: TRUNK

## 2023-07-06 ASSESSMENT — LOCATION SIMPLE DESCRIPTION DERM
LOCATION SIMPLE: POSTERIOR NECK
LOCATION SIMPLE: ABDOMEN
LOCATION SIMPLE: LEFT CHEEK
LOCATION SIMPLE: RIGHT CHEEK

## 2023-07-06 ASSESSMENT — LOCATION DETAILED DESCRIPTION DERM
LOCATION DETAILED: MID POSTERIOR NECK
LOCATION DETAILED: LEFT MEDIAL MALAR CHEEK
LOCATION DETAILED: RIGHT CENTRAL MALAR CHEEK
LOCATION DETAILED: PERIUMBILICAL SKIN

## 2023-07-06 NOTE — PROCEDURE: ADDITIONAL NOTES
Additional Notes: If pt would like to have cosmetic treatment it would be $250
Render Risk Assessment In Note?: no
Detail Level: Simple

## 2023-07-06 NOTE — HPI: RASH
What Type Of Note Output Would You Prefer (Optional)?: Bullet Format
How Severe Is Your Rash?: mild
Is This A New Presentation, Or A Follow-Up?: Rash
Additional History: Pt states pcp dx with contact dermatitis on face and Rx hydrocortisone. Pt seen improvement with medication initially
Additional History: Pts pcp dx with folliculitis Rx minocycline and hibiclens pts condition improves and gets worse periodically

## 2023-08-15 ENCOUNTER — LAB ENCOUNTER (OUTPATIENT)
Dept: LAB | Age: 38
End: 2023-08-15
Attending: FAMILY MEDICINE
Payer: COMMERCIAL

## 2023-08-15 DIAGNOSIS — E11.9 DIABETES MELLITUS (HCC): ICD-10-CM

## 2023-08-15 DIAGNOSIS — Z00.00 ROUTINE GENERAL MEDICAL EXAMINATION AT A HEALTH CARE FACILITY: Primary | ICD-10-CM

## 2023-08-15 LAB
ALBUMIN SERPL-MCNC: 4.3 G/DL (ref 3.4–5)
ALBUMIN/GLOB SERPL: 1.1 {RATIO} (ref 1–2)
ALP LIVER SERPL-CCNC: 65 U/L
ALT SERPL-CCNC: 43 U/L
ANION GAP SERPL CALC-SCNC: 5 MMOL/L (ref 0–18)
AST SERPL-CCNC: 29 U/L (ref 15–37)
BASOPHILS # BLD AUTO: 0.02 X10(3) UL (ref 0–0.2)
BASOPHILS NFR BLD AUTO: 0.2 %
BILIRUB SERPL-MCNC: 0.8 MG/DL (ref 0.1–2)
BUN BLD-MCNC: 21 MG/DL (ref 7–18)
BUN/CREAT SERPL: 20.4 (ref 10–20)
CALCIUM BLD-MCNC: 9.4 MG/DL (ref 8.5–10.1)
CHLORIDE SERPL-SCNC: 108 MMOL/L (ref 98–112)
CHOLEST SERPL-MCNC: 228 MG/DL (ref ?–200)
CO2 SERPL-SCNC: 25 MMOL/L (ref 21–32)
CREAT BLD-MCNC: 1.03 MG/DL
CREAT UR-SCNC: 64.3 MG/DL
DEPRECATED RDW RBC AUTO: 38.8 FL (ref 35.1–46.3)
EGFRCR SERPLBLD CKD-EPI 2021: 96 ML/MIN/1.73M2 (ref 60–?)
EOSINOPHIL # BLD AUTO: 0.27 X10(3) UL (ref 0–0.7)
EOSINOPHIL NFR BLD AUTO: 3.2 %
ERYTHROCYTE [DISTWIDTH] IN BLOOD BY AUTOMATED COUNT: 12.8 % (ref 11–15)
EST. AVERAGE GLUCOSE BLD GHB EST-MCNC: 111 MG/DL (ref 68–126)
FASTING PATIENT LIPID ANSWER: YES
FASTING STATUS PATIENT QL REPORTED: YES
GLOBULIN PLAS-MCNC: 4 G/DL (ref 2.8–4.4)
GLUCOSE BLD-MCNC: 95 MG/DL (ref 70–99)
HBA1C MFR BLD: 5.5 % (ref ?–5.7)
HCT VFR BLD AUTO: 43.3 %
HDLC SERPL-MCNC: 47 MG/DL (ref 40–59)
HGB BLD-MCNC: 14.5 G/DL
IMM GRANULOCYTES # BLD AUTO: 0.02 X10(3) UL (ref 0–1)
IMM GRANULOCYTES NFR BLD: 0.2 %
LDLC SERPL CALC-MCNC: 141 MG/DL (ref ?–100)
LYMPHOCYTES # BLD AUTO: 2.4 X10(3) UL (ref 1–4)
LYMPHOCYTES NFR BLD AUTO: 28.6 %
MCH RBC QN AUTO: 28.2 PG (ref 26–34)
MCHC RBC AUTO-ENTMCNC: 33.5 G/DL (ref 31–37)
MCV RBC AUTO: 84.2 FL
MICROALBUMIN UR-MCNC: 0.56 MG/DL
MICROALBUMIN/CREAT 24H UR-RTO: 8.7 UG/MG (ref ?–30)
MONOCYTES # BLD AUTO: 0.6 X10(3) UL (ref 0.1–1)
MONOCYTES NFR BLD AUTO: 7.2 %
NEUTROPHILS # BLD AUTO: 5.08 X10 (3) UL (ref 1.5–7.7)
NEUTROPHILS # BLD AUTO: 5.08 X10(3) UL (ref 1.5–7.7)
NEUTROPHILS NFR BLD AUTO: 60.6 %
NONHDLC SERPL-MCNC: 181 MG/DL (ref ?–130)
OSMOLALITY SERPL CALC.SUM OF ELEC: 289 MOSM/KG (ref 275–295)
PLATELET # BLD AUTO: 258 10(3)UL (ref 150–450)
POTASSIUM SERPL-SCNC: 4.2 MMOL/L (ref 3.5–5.1)
PROT SERPL-MCNC: 8.3 G/DL (ref 6.4–8.2)
RBC # BLD AUTO: 5.14 X10(6)UL
SODIUM SERPL-SCNC: 138 MMOL/L (ref 136–145)
TRIGL SERPL-MCNC: 220 MG/DL (ref 30–149)
TSI SER-ACNC: 1.62 MIU/ML (ref 0.36–3.74)
VLDLC SERPL CALC-MCNC: 41 MG/DL (ref 0–30)
WBC # BLD AUTO: 8.4 X10(3) UL (ref 4–11)

## 2023-08-15 PROCEDURE — 85025 COMPLETE CBC W/AUTO DIFF WBC: CPT

## 2023-08-15 PROCEDURE — 82570 ASSAY OF URINE CREATININE: CPT

## 2023-08-15 PROCEDURE — 80053 COMPREHEN METABOLIC PANEL: CPT

## 2023-08-15 PROCEDURE — 36415 COLL VENOUS BLD VENIPUNCTURE: CPT

## 2023-08-15 PROCEDURE — 80061 LIPID PANEL: CPT

## 2023-08-15 PROCEDURE — 82043 UR ALBUMIN QUANTITATIVE: CPT

## 2023-08-15 PROCEDURE — 83036 HEMOGLOBIN GLYCOSYLATED A1C: CPT

## 2023-08-15 PROCEDURE — 84443 ASSAY THYROID STIM HORMONE: CPT

## 2023-09-29 ENCOUNTER — HOSPITAL ENCOUNTER (OUTPATIENT)
Age: 38
Discharge: HOME | End: 2023-09-29

## 2023-09-29 ENCOUNTER — APPOINTMENT (OUTPATIENT)
Dept: GENERAL RADIOLOGY | Age: 38
End: 2023-09-29
Attending: NURSE PRACTITIONER

## 2023-09-29 VITALS
RESPIRATION RATE: 18 BRPM | TEMPERATURE: 98 F | SYSTOLIC BLOOD PRESSURE: 132 MMHG | HEART RATE: 98 BPM | OXYGEN SATURATION: 98 % | DIASTOLIC BLOOD PRESSURE: 75 MMHG

## 2023-09-29 DIAGNOSIS — R00.2 PALPITATIONS: Primary | ICD-10-CM

## 2023-09-29 LAB
#MXD IC: 0.9 X10ˆ3/UL (ref 0.1–1)
BUN BLD-MCNC: 19 MG/DL (ref 7–18)
CHLORIDE BLD-SCNC: 104 MMOL/L (ref 98–112)
CO2 BLD-SCNC: 27 MMOL/L (ref 21–32)
CREAT BLD-MCNC: 1 MG/DL
DDIMER WHOLE BLOOD: <200 NG/ML DDU (ref ?–400)
EGFRCR SERPLBLD CKD-EPI 2021: 99 ML/MIN/1.73M2 (ref 60–?)
GLUCOSE BLD-MCNC: 104 MG/DL (ref 70–99)
HCT VFR BLD AUTO: 44.7 %
HCT VFR BLD CALC: 47 %
HGB BLD-MCNC: 14.5 G/DL
ISTAT IONIZED CALCIUM FOR CHEM 8: 1.32 MMOL/L (ref 1.12–1.32)
LYMPHOCYTES # BLD AUTO: 2.3 X10ˆ3/UL (ref 1–4)
LYMPHOCYTES NFR BLD AUTO: 19.5 %
MCH RBC QN AUTO: 27 PG (ref 26–34)
MCHC RBC AUTO-ENTMCNC: 32.4 G/DL (ref 31–37)
MCV RBC AUTO: 83.1 FL (ref 80–100)
MIXED CELL %: 7.3 %
NEUTROPHILS # BLD AUTO: 8.7 X10ˆ3/UL (ref 1.5–7.7)
NEUTROPHILS NFR BLD AUTO: 73.2 %
PLATELET # BLD AUTO: 258 X10ˆ3/UL (ref 150–450)
POTASSIUM BLD-SCNC: 4.3 MMOL/L (ref 3.6–5.1)
RBC # BLD AUTO: 5.38 X10ˆ6/UL
SODIUM BLD-SCNC: 140 MMOL/L (ref 136–145)
TROPONIN I BLD-MCNC: 0.03 NG/ML
WBC # BLD AUTO: 11.9 X10ˆ3/UL (ref 4–11)

## 2023-09-29 PROCEDURE — 93005 ELECTROCARDIOGRAM TRACING: CPT

## 2023-09-29 PROCEDURE — 80047 BASIC METABLC PNL IONIZED CA: CPT

## 2023-09-29 PROCEDURE — 84484 ASSAY OF TROPONIN QUANT: CPT

## 2023-09-29 PROCEDURE — 85025 COMPLETE CBC W/AUTO DIFF WBC: CPT | Performed by: NURSE PRACTITIONER

## 2023-09-29 PROCEDURE — 85378 FIBRIN DEGRADE SEMIQUANT: CPT | Performed by: NURSE PRACTITIONER

## 2023-09-29 PROCEDURE — 36415 COLL VENOUS BLD VENIPUNCTURE: CPT

## 2023-09-29 PROCEDURE — 99214 OFFICE O/P EST MOD 30 MIN: CPT

## 2023-09-29 PROCEDURE — 93010 ELECTROCARDIOGRAM REPORT: CPT

## 2023-09-29 PROCEDURE — 71046 X-RAY EXAM CHEST 2 VIEWS: CPT | Performed by: NURSE PRACTITIONER

## 2023-09-29 PROCEDURE — 99215 OFFICE O/P EST HI 40 MIN: CPT

## 2023-09-29 NOTE — DISCHARGE INSTRUCTIONS
Follow-up with your primary care doctor. For any worsening or concerning symptoms, go to the nearest emergency department for further evaluation.

## 2023-09-29 NOTE — ED INITIAL ASSESSMENT (HPI)
Patient arrived ambulatory to room. Patient has h/o anxiety. Patient states he completed playing basketball this afternoon at about 2pm. He has been playing basketball for the past few weeks. He states he checked his carotid pulse at home and \"felt like it skipped a beat\" patient denies palpitations. Denies chest pain. Denies SOB. Denies n/v/d. Easy non labored respirations. No distress.

## 2023-09-30 LAB
ATRIAL RATE: 107 BPM
P AXIS: 47 DEGREES
P-R INTERVAL: 144 MS
Q-T INTERVAL: 330 MS
QRS DURATION: 80 MS
QTC CALCULATION (BEZET): 440 MS
R AXIS: 46 DEGREES
T AXIS: 16 DEGREES
VENTRICULAR RATE: 107 BPM

## 2023-10-03 ENCOUNTER — TELEPHONE (OUTPATIENT)
Dept: DERMATOLOGY CLINIC | Facility: CLINIC | Age: 38
End: 2023-10-03

## 2023-10-03 NOTE — TELEPHONE ENCOUNTER
Referral received via fax. Facial rash L25.9  6 visits. By Dr. Reyna Andrade    Referral sent to scan.

## 2023-11-21 ENCOUNTER — HOSPITAL ENCOUNTER (OUTPATIENT)
Age: 38
Discharge: HOME | End: 2023-11-21
Payer: COMMERCIAL

## 2023-11-21 VITALS
RESPIRATION RATE: 20 BRPM | DIASTOLIC BLOOD PRESSURE: 62 MMHG | TEMPERATURE: 98 F | HEART RATE: 85 BPM | SYSTOLIC BLOOD PRESSURE: 128 MMHG | OXYGEN SATURATION: 99 %

## 2023-11-21 DIAGNOSIS — J06.9 UPPER RESPIRATORY VIRUS: Primary | ICD-10-CM

## 2023-11-21 PROCEDURE — 99213 OFFICE O/P EST LOW 20 MIN: CPT

## 2023-11-21 RX ORDER — PREDNISONE 20 MG/1
40 TABLET ORAL DAILY
Qty: 10 TABLET | Refills: 0 | Status: SHIPPED | OUTPATIENT
Start: 2023-11-21 | End: 2023-11-26

## 2023-11-21 RX ORDER — CODEINE PHOSPHATE AND GUAIFENESIN 10; 100 MG/5ML; MG/5ML
5 SOLUTION ORAL EVERY 6 HOURS PRN
Qty: 50 ML | Refills: 0 | Status: SHIPPED | OUTPATIENT
Start: 2023-11-21

## 2023-11-21 NOTE — DISCHARGE INSTRUCTIONS
Take the medications as prescribed. The cough syrup will make you drowsy, do not drive while taking. Follow-up closely with your primary care doctor. Return for any concerns.

## 2023-11-21 NOTE — ED INITIAL ASSESSMENT (HPI)
Presents with his \"semi-annual\" illness of cough. States \"steroids usually help\". Coughing for 2 days. Unable to sleep last night due to \"spasms\". No fever. No chest pain or SOB. Using albuterol inhaler mor frequently. Declines testing.

## 2024-01-02 ENCOUNTER — TELEPHONE (OUTPATIENT)
Dept: SURGERY | Facility: CLINIC | Age: 39
End: 2024-01-02

## 2024-01-02 RX ORDER — TOPIRAMATE 25 MG/1
25 TABLET ORAL 2 TIMES DAILY
Qty: 60 TABLET | Refills: 0 | Status: SHIPPED | OUTPATIENT
Start: 2024-01-02 | End: 2024-02-01

## 2024-01-15 DIAGNOSIS — M72.2 PLANTAR FASCIAL FIBROMATOSIS: Primary | ICD-10-CM

## 2024-01-19 ENCOUNTER — HOSPITAL ENCOUNTER (OUTPATIENT)
Dept: MRI IMAGING | Facility: HOSPITAL | Age: 39
Discharge: HOME | End: 2024-01-19
Attending: PODIATRIST
Payer: COMMERCIAL

## 2024-01-19 DIAGNOSIS — M72.2 PLANTAR FASCIAL FIBROMATOSIS: ICD-10-CM

## 2024-01-19 PROCEDURE — 73721 MRI JNT OF LWR EXTRE W/O DYE: CPT | Performed by: PODIATRIST

## 2024-01-24 ENCOUNTER — APPOINTMENT (OUTPATIENT)
Dept: GENERAL RADIOLOGY | Age: 39
End: 2024-01-24
Attending: NURSE PRACTITIONER
Payer: COMMERCIAL

## 2024-01-24 ENCOUNTER — HOSPITAL ENCOUNTER (OUTPATIENT)
Age: 39
Discharge: HOME | End: 2024-01-24
Payer: COMMERCIAL

## 2024-01-24 VITALS
HEART RATE: 92 BPM | SYSTOLIC BLOOD PRESSURE: 150 MMHG | OXYGEN SATURATION: 96 % | TEMPERATURE: 98 F | RESPIRATION RATE: 20 BRPM | DIASTOLIC BLOOD PRESSURE: 76 MMHG

## 2024-01-24 DIAGNOSIS — J06.9 UPPER RESPIRATORY VIRUS: Primary | ICD-10-CM

## 2024-01-24 PROCEDURE — 71046 X-RAY EXAM CHEST 2 VIEWS: CPT | Performed by: NURSE PRACTITIONER

## 2024-01-24 PROCEDURE — 99214 OFFICE O/P EST MOD 30 MIN: CPT

## 2024-01-24 PROCEDURE — 99213 OFFICE O/P EST LOW 20 MIN: CPT

## 2024-01-24 RX ORDER — CODEINE PHOSPHATE AND GUAIFENESIN 10; 100 MG/5ML; MG/5ML
5 SOLUTION ORAL EVERY 6 HOURS PRN
Qty: 80 ML | Refills: 0 | Status: SHIPPED | OUTPATIENT
Start: 2024-01-24

## 2024-01-24 NOTE — ED PROVIDER NOTES
Patient Seen in: [facility]      History     Chief Complaint   Patient presents with    Cough/URI     Stated Complaint: Cough  Subjective:   38-year-old male with a history of anxiety and depression presents for a cough.  The cough started about a week ago.  He states he has been experiencing some wheezing.  He has been using an inhaler.  He did a televisit with his doctor about 5 days ago and was prescribed prednisone.  He has 1 more day left of the prednisone. Minimal nasal congestion. No sore throat or ear pain. He is concerned about pneumonia.  No fevers or chills.  He is eating and drinking normally.  No vomiting or diarrhea.  No chest pain or difficulty breathing.  No swelling to his lower extremities.  Positive sick contacts at home with the same symptoms.  He appears well and nontoxic.  He did have COVID less than 90 days ago.      Objective:   Past Medical History:   Diagnosis Date    Anxiety     Depression             History reviewed. No pertinent surgical history.           Social History     Socioeconomic History    Marital status:    Tobacco Use    Smoking status: Never    Smokeless tobacco: Never   Vaping Use    Vaping Use: Never used   Substance and Sexual Activity    Alcohol use: Not Currently    Drug use: Not Currently            Review of Systems   HENT:  Positive for congestion.    Respiratory:  Positive for cough and wheezing.    All other systems reviewed and are negative.      Positive for stated complaint: Cough  Other systems are as noted in HPI.  Constitutional and vital signs reviewed.      All other systems reviewed and negative except as noted above.    Physical Exam     ED Triage Vitals [01/24/24 1204]   /76   Pulse 112   Resp 20   Temp 98.1 °F (36.7 °C)   Temp src Temporal   SpO2 96 %   O2 Device None (Room air)     Current:/76   Pulse 92   Temp 98.1 °F (36.7 °C) (Temporal)   Resp 20   SpO2 96%     Physical Exam  Vitals and nursing note reviewed.    Constitutional:       General: He is not in acute distress.     Appearance: Normal appearance. He is not toxic-appearing.   HENT:      Head: Normocephalic.      Right Ear: Tympanic membrane normal.      Left Ear: Tympanic membrane normal.      Nose: Congestion present. No rhinorrhea.      Mouth/Throat:      Mouth: Mucous membranes are moist.      Pharynx: Oropharynx is clear. Posterior oropharyngeal erythema present. No oropharyngeal exudate.   Eyes:      Extraocular Movements: Extraocular movements intact.      Conjunctiva/sclera: Conjunctivae normal.      Pupils: Pupils are equal, round, and reactive to light.   Cardiovascular:      Rate and Rhythm: Normal rate and regular rhythm.   Pulmonary:      Effort: Pulmonary effort is normal. No tachypnea or respiratory distress.      Breath sounds: No decreased breath sounds, rhonchi or rales.      Comments: End exp wheezing bilaterally.  Musculoskeletal:         General: Normal range of motion.      Cervical back: Normal range of motion and neck supple.   Lymphadenopathy:      Cervical: No cervical adenopathy.   Skin:     General: Skin is warm and dry.      Capillary Refill: Capillary refill takes less than 2 seconds.      Findings: No rash.   Neurological:      General: No focal deficit present.      Mental Status: He is alert and oriented to person, place, and time.      Motor: No weakness.   Psychiatric:         Mood and Affect: Mood normal.         Behavior: Behavior normal.         ED Course   Labs Reviewed - No data to display  XR CHEST PA + LAT CHEST (CPT=71046)          PROCEDURE: XR CHEST PA + LAT CHEST (CPT=71046)     COMPARISON: [facility], XR CHEST PA + LAT CHEST (MKV=48706), 9/29/2023, 4:17 PM.     INDICATIONS: Productive cough x1 week. History of hypertension and diabetes.     TECHNIQUE:   Two views.       FINDINGS:   CARDIAC/VASC: Normal.  No cardiac silhouette abnormality or cardiomegaly.  Unremarkable pulmonary  vasculature.    MEDIAST/KEE: No visible mass or adenopathy.   LUNGS/PLEURA: Normal.  No significant pulmonary parenchymal abnormalities.  No effusion or pleural thickening.    BONES: No fracture or visible bony lesion.   OTHER: Negative.                =====  CONCLUSION: Normal examination.             Dictated by (CST): Julian Chu MD on 1/24/2024 at 12:15 PM       Finalized by (CST): Julian Chu MD on 1/24/2024 at 12:16 PM                   [facility]       Medical Decision Making  The chest x-ray is negative.  The patient appears well.  He has a history of anxiety.  His heart rate was retaken at 92.  He has an inhaler to use every 4 hours at home.  He declined a nebulizer treatment to help with the wheezing.  He will continue his prednisone.  We discussed that I do not feel he needs antibiotics at this time.  He will continue supportive care and follow-up with his primary care doctor if his symptoms persist or worsen.    Amount and/or Complexity of Data Reviewed  Radiology: ordered and independent interpretation performed.     Details: Chest x-ray negative    Risk  OTC drugs.        Disposition and Plan     Clinical Impression:  1. Upper respiratory virus         Disposition:  Discharge  1/24/2024 12:27 pm    Follow-up:  Clive Gracia MD  [address] [address]  [phone]    Schedule an appointment as soon as possible for a visit in 3 days            Medications Prescribed:  Current Discharge Medication List

## 2024-01-24 NOTE — ED INITIAL ASSESSMENT (HPI)
Had a tele health visit on the 19th for cough and congestion,states still with wheezing, no fever, no sob, has 1 more day of steroids, h/o covid 2 months ago

## 2024-01-24 NOTE — DISCHARGE INSTRUCTIONS
Continue pushing fluids.  Rest.  Tylenol or ibuprofen as needed for pain or fever.  Continue using your inhaler as needed, take the prednisone as you have been, and use a cough suppressant as needed.  Follow-up with your doctor.  Return for any worsening symptoms or other concerns.

## 2024-01-29 ENCOUNTER — OFFICE VISIT (OUTPATIENT)
Dept: SURGERY | Facility: CLINIC | Age: 39
End: 2024-01-29
Payer: COMMERCIAL

## 2024-01-29 VITALS
HEART RATE: 125 BPM | OXYGEN SATURATION: 96 % | WEIGHT: 245 LBS | HEIGHT: 68.3 IN | SYSTOLIC BLOOD PRESSURE: 118 MMHG | BODY MASS INDEX: 37.13 KG/M2 | DIASTOLIC BLOOD PRESSURE: 80 MMHG

## 2024-01-29 DIAGNOSIS — G47.33 OSA ON CPAP: Primary | ICD-10-CM

## 2024-01-29 DIAGNOSIS — I10 HYPERTENSION, UNSPECIFIED TYPE: ICD-10-CM

## 2024-01-29 DIAGNOSIS — E78.2 HYPERCHOLESTEROLEMIA WITH HYPERTRIGLYCERIDEMIA: ICD-10-CM

## 2024-01-29 DIAGNOSIS — F41.9 ANXIETY: ICD-10-CM

## 2024-01-29 DIAGNOSIS — Z51.81 ENCOUNTER FOR THERAPEUTIC DRUG MONITORING: ICD-10-CM

## 2024-01-29 DIAGNOSIS — E66.9 OBESITY (BMI 30-39.9): ICD-10-CM

## 2024-01-29 PROCEDURE — 3079F DIAST BP 80-89 MM HG: CPT | Performed by: NURSE PRACTITIONER

## 2024-01-29 PROCEDURE — 99214 OFFICE O/P EST MOD 30 MIN: CPT | Performed by: NURSE PRACTITIONER

## 2024-01-29 PROCEDURE — 3008F BODY MASS INDEX DOCD: CPT | Performed by: NURSE PRACTITIONER

## 2024-01-29 PROCEDURE — 3074F SYST BP LT 130 MM HG: CPT | Performed by: NURSE PRACTITIONER

## 2024-01-29 RX ORDER — TOPIRAMATE 25 MG/1
25 TABLET ORAL 2 TIMES DAILY
Qty: 180 TABLET | Refills: 1 | Status: SHIPPED | OUTPATIENT
Start: 2024-01-29

## 2024-01-29 NOTE — PROGRESS NOTES
[facility], [facility], [place]  [facility] [address] [place] [id number]  Dept: [phone]       Patient:  Varinder Stewart  :      1985  MRN:      [id number]    Chief Complaint:    Chief Complaint   Patient presents with    Follow - Up    Obesity    Weight Management       SUBJECTIVE     History of Present Illness:  Varinder is being seen today for a follow-up for medically supported weight loss.    Initial HPI:  35 yo.   Reports weight gain began 6 years ago.   Reports significant weight gain over the past 4-5 months.  Barrier: Healing foot fracture, left.       Patient is considering medications and bariatric surgery for weight loss.     Patient has history of eating disorder(s).  Binge Eating.      Patient is employed: [employer] (works from home).  Patient lives with: 2 kids, wife.     Patient's goal weight: 185 lbs  Biggest weight loss in the past: 20 lbs  How weight loss was achieved: [place]   Heaviest weight ever: Current   Previous use of medical weight loss medications: None      Physical activity:   Limited due to left foot fracture      Sleep: 6 hours/night    Sleep screening: KARINA on CPAP    Past Medical History:   Past Medical History:   Diagnosis Date    Anxiety     Depression         Comorbidities:  Diabetes-Improvement?  yes, Hypertension-Improvement?  yes and Hyperlipidemia-Improvement?  yes    OBJECTIVE     Vitals: /80 (BP Location: Right arm, Patient Position: Sitting, Cuff Size: adult)   Pulse (!) 125   Ht 5' 8.3\" (1.735 m)   Wt 245 lb (111.1 kg)   SpO2 96%   BMI 36.93 kg/m²     Initial weight loss: -02   Total weight loss: -32   Start weight: 277    Wt Readings from Last 6 Encounters:   24 245 lb (111.1 kg)   22 249 lb 1.6 oz (113 kg)   22 247 lb 1.6 oz (112.1 kg)   22 275 lb (124.7 kg)   22 277 lb 3 oz (125.7 kg)   22 260 lb (117.9 kg)       Patient Medications:    Current Outpatient  Medications   Medication Sig Dispense Refill    guaiFENesin-codeine (CHERATUSSIN AC) 100-10 MG/5ML Oral Solution Take 5 mL by mouth every 6 (six) hours as needed for cough. 80 mL 0    topiramate 25 MG Oral Tab Take 1 tablet (25 mg total) by mouth 2 (two) times daily. 60 tablet 0    guaiFENesin-codeine (CHERATUSSIN AC) 100-10 MG/5ML Oral Solution Take 5 mL by mouth every 6 (six) hours as needed for cough. 50 mL 0    Spacer/Aero-Holding Chambers Does not apply Device Use with albuterol inhaler 1 each 0    ALPRAZolam 0.5 MG Oral Tab Take 0.5 mg by mouth daily as needed.      albuterol 108 (90 Base) MCG/ACT Inhalation Aero Soln Inhale into the lungs every 6 (six) hours as needed for Wheezing.      Lisinopril-hydroCHLOROthiazide 20-25 MG Oral Tab Take 1 tablet by mouth daily.      Sertraline HCl 50 MG Oral Tab Take 50 mg by mouth every morning.       Allergies:  Patient has no known allergies.     Social History:  Reviewed    Surgical History:  History reviewed. No pertinent surgical history.  Family History:    Family History   Problem Relation Age of Onset    Other (Other) Father         Obesity        Food Journal  Reviewed and Discussed:       Patient has a Food Journal?: no   Patient is reading nutrition labels?  yes  Average Caloric Intake:     Average CHO Intake:   Is patient exercising? no limited in boot- tore plantar fascia  Type of exercise? Basketball, swimming     Eating Habits  Patient states the following:  Eats 3 meal(s) per day  Length of time it takes to consume a meal:    # of snacks per day: Minimal   Type of snacks:    Amount of soda consumption per day:    Amount of water (in ounces) per day:    Toughest challenge:      Under 2200 calories  Limits to 125 grams carbohydrates    Nutritional Goals  Eat 3-4 cups of fresh fruits or vegetables daily    Behavior Modifications Reviewed and Discussed  Eat breakfast, Eat 3 meals per day, Plan meals in advance, Read nutrition labels, Drink 64 oz of water per  day, Maintain a daily food journal, Utlize portion control strategies to reduce calorie intake, Identify triggers for eating and manage cues and Eat slowly and take 20 to 30 minutes to complete each meal    Exercise Goals Reviewed and Discussed    Aim for 150 minutes moderate level exercise weekly with 2-3 days strength training as tolerated    ROS:    Constitutional: positive for fatigue  Respiratory: positive for asthma  Cardiovascular: negative  Gastrointestinal: positive for reflux symptoms  Integument/breast: negative  Hematologic/lymphatic: negative  Musculoskeletal:positive for back pain and left foot pain, healing fracture  Neurological: negative  Behavioral/Psych: positive for anxiety  Endocrine: negative  All other systems were reviewed and are negative    Physical Exam:   General appearance: alert, appears stated age, cooperative and obese   Head: Normocephalic, without obvious abnormality, atraumatic  Neck: no adenopathy, no carotid bruit, no JVD, supple, symmetrical, trachea midline and thyroid not enlarged, symmetric, no tenderness/mass/nodules  Lungs: clear to auscultation bilaterally  Heart: S1, S2 normal, no murmur, click, rub or gallop, regular rate and rhythm  Abdomen: soft, non-tender; bowel sounds normal; no masses,  no organomegaly  Extremities: extremities normal, atraumatic, no cyanosis or edema  Pulses: 2+ and symmetric  Skin: Skin color, texture, turgor normal. No rashes or lesions  Neurologic: Grossly normal    ASSESSMENT       Encounter Diagnosis(ses):   Encounter Diagnoses   Name Primary?    KARINA on CPAP Yes    Hypercholesterolemia with hypertriglyceridemia     Hypertension, unspecified type     Anxiety     Obesity (BMI 30-39.9)     Encounter for therapeutic drug monitoring        PLAN         Diagnoses and all orders for this visit:    KARINA on CPAP    Hypercholesterolemia with hypertriglyceridemia    Hypertension, unspecified type    Anxiety    Obesity (BMI 30-39.9)    Encounter for  therapeutic drug monitoring      KARINA: Patient was instructed to continue wearing their CPaP as recommended.      HYPERTENSION: Blood pressure stable on the above medications. No interval change in antihypertensive medication.      DYSLIPIDEMIA: On statin. Recommend dietary changes and lifestyle modifications as discussed below. Monitor.     Lab Results   Component Value Date/Time    CHOLEST 228 (H) 08/15/2023 12:02 PM     (H) 08/15/2023 12:02 PM    HDL 47 08/15/2023 12:02 PM    TRIG 220 (H) 08/15/2023 12:02 PM    VLDL 41 (H) 08/15/2023 12:02 PM     Borderline Diabetes:   Continue current medications.    OBESITY/WEIGHT GAIN:     Recommended intensive lifestyle and behavioral modifications at this time for weight loss.     Educated patient on lifestyle modifications: Whole Food/Plant Strong/Low Glycemic Index diet, moderate alcohol consumption, reduced sodium intake to no more than 2,400 mg/day, and at least 150 minutes of moderate physical activity per week.   Avoid processed, poor quality carbohydrates, refined grains, flour, sugar.     Goals for next month:  1. Keep a food log.   2. Drink 64 ounces of non-caloric beverages per day. No fruit juices or regular soda.  3. Aim for 150 minutes moderate exercise per week.    4. Increase fruit and vegetable servings to 5-6 per day.    5. Improve sleep and stress.      Reviewed other labs:  Vitamin D low- supplement OTC daily.  Leptin elevated.   A1c 7.0.   LFTs elevated. Repeat at this time.   No other sig abnormalities: B 12, thyroid studies.   8/15/23- CBC, a1c 5.5, CMP in range.     Discussed medication options for weight loss in detail with patient.      Denies history of renal stones.   Leptin elevated.   Continue 25 mg topiramate twice a day for cravings and to assist weight loss.     Continue metformin 750 mg in the AM- ordered by pcp.      Consider stimulant: Diethylpropion.   Could benefit from Vyvanse.   Monitor closely- hx anxiety.     Discussed risks,  benefits, rationale, and side effects of medication(s). Patient states understanding. All questions answered.      s/p stress normal stress test on July 22, 2022.     Continue CBT: include Binge Eating.      Meet with RD.     RTC 4 months.      THEODORA Jacobs

## 2024-01-29 NOTE — PATIENT INSTRUCTIONS
Daily Calories:  120-174 lbs: 1200 calories/day.  175-219 lbs: 1500 calories/day.  220-249 lbs: 1800 calories/day.   250 lbs or more: 2,000 calories/day.

## 2024-02-07 ENCOUNTER — HOSPITAL ENCOUNTER (OUTPATIENT)
Age: 39
Discharge: HOME | End: 2024-02-07
Payer: COMMERCIAL

## 2024-02-07 VITALS
HEART RATE: 97 BPM | TEMPERATURE: 98 F | OXYGEN SATURATION: 98 % | SYSTOLIC BLOOD PRESSURE: 157 MMHG | RESPIRATION RATE: 18 BRPM | DIASTOLIC BLOOD PRESSURE: 75 MMHG

## 2024-02-07 DIAGNOSIS — H66.002 NON-RECURRENT ACUTE SUPPURATIVE OTITIS MEDIA OF LEFT EAR WITHOUT SPONTANEOUS RUPTURE OF TYMPANIC MEMBRANE: Primary | ICD-10-CM

## 2024-02-07 DIAGNOSIS — J40 BRONCHITIS: ICD-10-CM

## 2024-02-07 PROCEDURE — 99213 OFFICE O/P EST LOW 20 MIN: CPT

## 2024-02-07 PROCEDURE — 99214 OFFICE O/P EST MOD 30 MIN: CPT

## 2024-02-07 RX ORDER — AMOXICILLIN AND CLAVULANATE POTASSIUM 875; 125 MG/1; MG/1
1 TABLET, FILM COATED ORAL 2 TIMES DAILY
Qty: 20 TABLET | Refills: 0 | Status: SHIPPED | OUTPATIENT
Start: 2024-02-07 | End: 2024-02-17

## 2024-02-07 NOTE — ED PROVIDER NOTES
Patient Seen in: [facility]      History     Chief Complaint   Patient presents with    Ear Problem     Stated Complaint: Ear issues    Subjective:   38-year-old male with anxiety, depression, hypertension, obstructive Sleep Apnea presents from home.  Patient is here with complaint of bilateral ear pain.  Pain with decreased hearing in both ears.  Mild drainage.  Symptoms for 3 days.  No nuchal the sleeping last night, had pain when laying on his left side.  States he has had cough for about 4 weeks.  Has been seen here twice previously with his primary doctor.  He is currently taking Singulair and an inhaler.  Also using Mucinex.  No drainage from the ear.  No fever.  States his son was recently diagnosed with bilateral ear infection and pinkeye.  Patient denies eye drainage or itching.    The history is provided by the patient. No  was used.         Objective:   Past Medical History:   Diagnosis Date    Anxiety     Depression               History reviewed. No pertinent surgical history.             Social History     Socioeconomic History    Marital status:    Tobacco Use    Smoking status: Never    Smokeless tobacco: Never   Vaping Use    Vaping Use: Never used   Substance and Sexual Activity    Alcohol use: Not Currently    Drug use: Not Currently              Review of Systems    Positive for stated complaint: Ear issues  Other systems are as noted in HPI.  Constitutional and vital signs reviewed.      All other systems reviewed and negative except as noted above.    Physical Exam     ED Triage Vitals [02/07/24 1030]   /75   Pulse 97   Resp 18   Temp 98 °F (36.7 °C)   Temp src Temporal   SpO2 98 %   O2 Device None (Room air)       Current:/75   Pulse 97   Temp 98 °F (36.7 °C) (Temporal)   Resp 18   SpO2 98%         Physical Exam  Vitals and nursing note reviewed.   Constitutional:       General: He is not in acute distress.     Appearance: Normal  appearance. He is not ill-appearing or toxic-appearing.   HENT:      Head: Normocephalic and atraumatic.      Right Ear: Ear canal and external ear normal. No drainage, swelling or tenderness. Tympanic membrane is erythematous (mild).      Left Ear: Ear canal and external ear normal. No drainage, swelling or tenderness. Tympanic membrane is erythematous (slightly swollen).      Nose: Nose normal.      Mouth/Throat:      Mouth: Mucous membranes are moist.      Pharynx: Oropharynx is clear. No pharyngeal swelling or posterior oropharyngeal erythema.      Tonsils: No tonsillar exudate.   Eyes:      Pupils: Pupils are equal, round, and reactive to light.   Cardiovascular:      Rate and Rhythm: Normal rate and regular rhythm.      Pulses: Normal pulses.   Pulmonary:      Effort: Pulmonary effort is normal. No respiratory distress.      Comments: Very scant expiratory wheeze to the left upper lobe.  No respiratory distress.  No hypoxia.  Pulse ox 98% room air  Abdominal:      General: Abdomen is flat.      Palpations: Abdomen is soft.   Musculoskeletal:         General: No signs of injury. Normal range of motion.      Cervical back: Normal range of motion and neck supple.   Skin:     General: Skin is warm and dry.      Capillary Refill: Capillary refill takes less than 2 seconds.   Neurological:      General: No focal deficit present.      Mental Status: He is alert and oriented to person, place, and time.      GCS: GCS eye subscore is 4. GCS verbal subscore is 5. GCS motor subscore is 6.   Psychiatric:         Mood and Affect: Mood normal.         Behavior: Behavior normal.         Thought Content: Thought content normal.         Judgment: Judgment normal.               ED Course   Labs Reviewed - No data to display      MDM        Medical Decision Making  Left otitis media  Differential diagnosis: Otitis media, effusion, otitis externa  Left TM erythematous and slightly swollen.  Mild erythema to the right TM.  No TM  rupture.  Consistent with early otitis media.  No otitis externa.  No mastoiditis.  He does have very mild expiratory wheezing to the left upper lobe but no respiratory distress.  Recently seen multiple times for similar symptoms.  Chest x-ray was negative for pneumonia.  States he is much improved.  No shortness of breath.  Discussed the option of repeat course of steroids but patient states that his primary doctor told him not to take any more steroids.  He will continue using his Singulair and inhaler at home.  Plan of care: Augmentin for otitis media.  Ibuprofen for pain.  Continue Singulair and inhaler  Results and plan of care discussed with the patient/family. They are in agreement with discharge. They understand to follow up with their primary doctor or the referral physician for further evaluation, especially if no improvement.  Also discussed the limitations of immediate care, patient is aware that if symptoms are worse they should go to the emergency room. Verbal and written discharge instructions were given.       Problems Addressed:  Bronchitis: acute illness or injury  Non-recurrent acute suppurative otitis media of left ear without spontaneous rupture of tympanic membrane: acute illness or injury    Amount and/or Complexity of Data Reviewed  External Data Reviewed: radiology and notes.     Details: Seen here 11/21 and given prednisone, 1/17 with a chest x-ray that was negative for pneumonia    Risk  OTC drugs.  Prescription drug management.        Disposition and Plan     Clinical Impression:  1. Non-recurrent acute suppurative otitis media of left ear without spontaneous rupture of tympanic membrane    2. Bronchitis         Disposition:  Discharge  2/7/2024 10:45 am    Follow-up:  Clive Gracia MD  [address]  [address] [address]  [phone]                Medications Prescribed:  Discharge Medication List as of 2/7/2024 10:49 AM        START taking these medications    Details    amoxicillin clavulanate 875-125 MG Oral Tab Take 1 tablet by mouth 2 (two) times daily for 10 days., Normal, Disp-20 tablet, R-0

## 2024-02-07 NOTE — DISCHARGE INSTRUCTIONS
Take Augmentin twice a day to treat the ear infection.  Continue Singulair and your inhaler.  Tylenol or Motrin as needed for pain.  Schedule follow-up with your primary doctor.

## 2024-02-26 ENCOUNTER — TELEPHONE (OUTPATIENT)
Dept: OTOLARYNGOLOGY | Facility: CLINIC | Age: 39
End: 2024-02-26

## 2024-02-26 ENCOUNTER — OFFICE VISIT (OUTPATIENT)
Dept: OTOLARYNGOLOGY | Facility: CLINIC | Age: 39
End: 2024-02-26

## 2024-02-26 VITALS — HEIGHT: 67 IN | WEIGHT: 245 LBS | BODY MASS INDEX: 38.45 KG/M2

## 2024-02-26 DIAGNOSIS — H65.93 FLUID LEVEL BEHIND TYMPANIC MEMBRANE OF BOTH EARS: ICD-10-CM

## 2024-02-26 DIAGNOSIS — H61.22 IMPACTED CERUMEN, LEFT EAR: ICD-10-CM

## 2024-02-26 DIAGNOSIS — H69.90 EUSTACHIAN TUBE DISORDER, UNSPECIFIED LATERALITY: ICD-10-CM

## 2024-02-26 DIAGNOSIS — J01.90 ACUTE SINUSITIS, RECURRENCE NOT SPECIFIED, UNSPECIFIED LOCATION: Primary | ICD-10-CM

## 2024-02-26 RX ORDER — METFORMIN HYDROCHLORIDE 750 MG/1
750 TABLET, EXTENDED RELEASE ORAL
COMMUNITY
Start: 2024-01-04

## 2024-02-26 RX ORDER — LISINOPRIL 20 MG/1
20 TABLET ORAL DAILY
COMMUNITY
Start: 2024-01-04

## 2024-02-26 RX ORDER — GENTAMICIN SULFATE 3 MG/ML
SOLUTION/ DROPS OPHTHALMIC
COMMUNITY
Start: 2023-09-16

## 2024-02-26 RX ORDER — PREDNISONE 20 MG/1
20 TABLET ORAL DAILY
COMMUNITY
Start: 2024-02-15

## 2024-02-26 RX ORDER — FLUTICASONE PROPIONATE AND SALMETEROL 100; 50 UG/1; UG/1
1 POWDER RESPIRATORY (INHALATION) 2 TIMES DAILY
COMMUNITY
Start: 2024-02-02

## 2024-02-26 RX ORDER — MONTELUKAST SODIUM 10 MG/1
10 TABLET ORAL DAILY
COMMUNITY
Start: 2024-02-01

## 2024-02-26 RX ORDER — PREDNISONE 20 MG/1
TABLET ORAL
Qty: 15 TABLET | Refills: 0 | Status: SHIPPED | OUTPATIENT
Start: 2024-02-26 | End: 2024-03-07

## 2024-02-26 RX ORDER — SULFAMETHOXAZOLE AND TRIMETHOPRIM 800; 160 MG/1; MG/1
1 TABLET ORAL EVERY 12 HOURS
Qty: 28 TABLET | Refills: 0 | Status: SHIPPED | OUTPATIENT
Start: 2024-02-26 | End: 2024-03-11

## 2024-02-26 RX ORDER — GENTAMICIN SULFATE 3 MG/ML
1 SOLUTION/ DROPS OPHTHALMIC EVERY 4 HOURS
Qty: 1 EACH | Refills: 1 | Status: SHIPPED | OUTPATIENT
Start: 2024-02-26 | End: 2024-03-04

## 2024-02-26 RX ORDER — ATORVASTATIN CALCIUM 20 MG/1
20 TABLET, FILM COATED ORAL DAILY
COMMUNITY
Start: 2024-02-15

## 2024-02-26 NOTE — PROGRESS NOTES
Varinder Stewart is a 38 year old male.   Chief Complaint   Patient presents with    New Patient    Ear Problem     Patient reports clogged ears, states he might have fluid in ears and ringing, he was treated 2 weeks ago with antibiotics.    Sinus Problem     Patient reports sinus issues and pink eye.       ASSESSMENT AND PLAN:   1. Acute sinusitis, recurrence not specified, unspecified location  38-year-old presents with 3 to 4 weeks of ringing in ears and fullness in his ears.  He was on a course of Augmentin from February 7 of February 17 without much relief although he did have less pressure and pain in the ears after the antibiotics.  He does have young children and gets recurrent colds from them.  He is currently dealing with pinkeye is responding to gentamicin eardrops.    Exam he has bilateral bulging tympanic membranes with thickened tympanic membranes, not erythematous.  On nasal endoscopy he had purulence in both sinonasal cavities and posterior pharyngeal erythema    Appears that he has bilateral middle ear effusions in the setting of an acute/ recurrent sinusitis.  He has 2 young children he may be getting recurrent colds from them not allowing him to recover with his eustachian tubes.  He has already been on a course of Augmentin will place him on a 2-week course of Bactrim for his sinusitis.  Also prescribe a course of prednisone and Claritin-D for his eustachian tube edema and sinusitis.  Discussed the pertinent side effects of these medications.  I would like to see him back in 2 weeks if still not improved from an ear standpoint could consider myringotomy for his symptoms which he states are quite severe.  Will also refill his gentamicin eyedrops that he has been tolerating well and resulting in improvement although he is out of the eyedrops.    - loratadine-pseudoephedrine ER  MG Oral Tablet 24 Hr; Take 1 tablet by mouth at bedtime.  Dispense: 30 tablet; Refill: 1    2. Eustachian tube  disorder, unspecified laterality      3. Fluid level behind tympanic membrane of both ears    4. Cerumen, left ear        The patient indicates understanding of these issues and agrees to the plan.      EXAM:   Ht 5' 7\" (1.702 m)   Wt 245 lb (111.1 kg)   BMI 38.37 kg/m²     Pertinent exam findings may also be noted above in assessment and plan     System Details   Skin Inspection - Normal.   Constitutional Overall appearance - Normal.   Head/Face Symmetric, TMJ tenderness not present    Eyes EOMI, PERRL   Right ear:  Canal clear, TM intact, no PAT   Left ear:  Canal clear, TM intact, no PAT   Nose: Septum midline, inferior turbinates not enlarged, nasal valves without collapse    Oral cavity/Oropharynx: No lesions or masses on inspection or palpation, tonsils symmetric    Neck: Soft without LAD, thyroid not enlarged  Voice clear/ no stridor   Other:      Scopes and Procedures:     Nasal Endoscopy Procedure Note     Due to inability for adequate examination of the nose and nasopharynx and need for magnification to perform the examination, endoscopy was performed.  Risks and benefits were discussed with patient/family and they have given verbal consent to proceed.    Pre-operative Diagnosis:   1. Acute sinusitis, recurrence not specified, unspecified location    2. Eustachian tube disorder, unspecified laterality    3. Fluid level behind tympanic membrane of both ears        Post-operative Diagnosis: Same    Procedure: Diagnostic nasal endoscopy    Anesthesia: Topical anesthetic [place]     Surgeon Rene Lizama MD    EBL: 0cc    Procedure Detail & Findings:     After placement of topical anesthetic intranasally the endoscope was inserted into each nares and driven through the nasal cavity into the nasopharynx. The following findings were noted:    Septum: Midline  Inferior turbinates: Normal  Middle meatus: Patent  Middle turbinates: Normal  Purulence:  On nasal endoscopy he had purulence in both sinonasal cavities and  posterior pharyngeal erythema  Polyps: None noted  Nasopharynx and eustachian tube: No masses  Other: The middle and superior meatus, the turbinates, and the spheno-ethmoid recess were inspected and seen to be without significant abnormal findings.     Condition: Stable    Complications: Patient tolerated the procedure well with no immediate complication.    Rene Lizama MD    Canals:  Left: Canal with cerumen preventing adequate view of TM, debrided with instrumentation    Tympanic Membranes:  Left: Normal tympanic membrane.     TM Visualized Method:   Left TM examined via otomicroscopy.      PROCEDURE:   Removal of cerumen impaction   The cerumen impaction was completely removed on the left side using microscopy as necessary.   Removal was completed by using a curette and suction.     Current Outpatient Medications   Medication Sig Dispense Refill    atorvastatin 20 MG Oral Tab Take 1 tablet (20 mg total) by mouth daily.      fluticasone-salmeterol 100-50 MCG/ACT Inhalation Aerosol Powder, Breath Activated Inhale 1 puff into the lungs 2 (two) times daily.      gentamicin 0.3 % Ophthalmic Solution       lisinopril 20 MG Oral Tab Take 1 tablet (20 mg total) by mouth daily.      metFORMIN  MG Oral Tablet 24 Hr Take 1 tablet (750 mg total) by mouth daily with dinner.      montelukast 10 MG Oral Tab Take 1 tablet (10 mg total) by mouth daily.      sulfamethoxazole-trimethoprim -160 MG Oral Tab per tablet Take 1 tablet by mouth every 12 (twelve) hours for 14 days. 28 tablet 0    predniSONE 20 MG Oral Tab Take 2 tablets (40 mg total) by mouth daily for 5 days, THEN 1 tablet (20 mg total) daily for 5 days. 15 tablet 0    loratadine-pseudoephedrine ER  MG Oral Tablet 24 Hr Take 1 tablet by mouth at bedtime. 30 tablet 1    gentamicin 0.3 % Ophthalmic Solution Place 1 drop into both eyes every 4 (four) hours for 7 days. 1 each 1    topiramate 25 MG Oral Tab Take 1 tablet (25 mg total) by mouth 2 (two)  times daily. 180 tablet 1    guaiFENesin-codeine (CHERATUSSIN AC) 100-10 MG/5ML Oral Solution Take 5 mL by mouth every 6 (six) hours as needed for cough. 80 mL 0    guaiFENesin-codeine (CHERATUSSIN AC) 100-10 MG/5ML Oral Solution Take 5 mL by mouth every 6 (six) hours as needed for cough. 50 mL 0    Spacer/Aero-Holding Chambers Does not apply Device Use with albuterol inhaler 1 each 0    ALPRAZolam 0.5 MG Oral Tab Take 1 tablet (0.5 mg total) by mouth daily as needed.      albuterol 108 (90 Base) MCG/ACT Inhalation Aero Soln Inhale into the lungs every 6 (six) hours as needed for Wheezing.      Lisinopril-hydroCHLOROthiazide 20-25 MG Oral Tab Take 1 tablet by mouth daily.      Sertraline HCl 50 MG Oral Tab Take 1 tablet (50 mg total) by mouth every morning.      predniSONE 20 MG Oral Tab Take 1 tablet (20 mg total) by mouth daily. (Patient not taking: Reported on 2/26/2024)        Past Medical History:   Diagnosis Date    Anxiety     Depression       Social History:  Social History     Socioeconomic History    Marital status:    Tobacco Use    Smoking status: Never    Smokeless tobacco: Never   Vaping Use    Vaping Use: Never used   Substance and Sexual Activity    Alcohol use: Not Currently    Drug use: Not Currently          Rene Lizama MD  2/26/2024  12:41 PM

## 2024-02-26 NOTE — TELEPHONE ENCOUNTER
Per pt was seen today, states he has questions he forgot to ask Dr. Lizama, asking if he can speak to Dr. Lizama. Please call thank you.

## 2024-02-26 NOTE — TELEPHONE ENCOUNTER
Patient had some concerns regarding visit today. Patient wanted to know if he had an ear infection or not? Due to his ear being so full. Also, if he can still take Ibuprofen and/or benadryl with the medication prescribed during visit. Doctor please advise.

## 2024-03-08 ENCOUNTER — OFFICE VISIT (OUTPATIENT)
Dept: OTOLARYNGOLOGY | Facility: CLINIC | Age: 39
End: 2024-03-08

## 2024-03-08 DIAGNOSIS — H65.92 FLUID LEVEL BEHIND TYMPANIC MEMBRANE OF LEFT EAR: ICD-10-CM

## 2024-03-08 DIAGNOSIS — H69.90 EUSTACHIAN TUBE DISORDER, UNSPECIFIED LATERALITY: Primary | ICD-10-CM

## 2024-03-08 PROCEDURE — 99213 OFFICE O/P EST LOW 20 MIN: CPT | Performed by: STUDENT IN AN ORGANIZED HEALTH CARE EDUCATION/TRAINING PROGRAM

## 2024-03-08 PROCEDURE — 92504 EAR MICROSCOPY EXAMINATION: CPT | Performed by: STUDENT IN AN ORGANIZED HEALTH CARE EDUCATION/TRAINING PROGRAM

## 2024-03-08 NOTE — PROGRESS NOTES
Varinder Stewart is a 38 year old male.   Chief Complaint   Patient presents with    Follow - Up     Clogged feeling bilateral with ringing in ears        ASSESSMENT AND PLAN:   1. Eustachian tube disorder, unspecified laterality  38-year-old presents in follow-up regarding a sinusitis and middle ear effusions.  He was last seen 2 weeks ago was started on medical management and for the most part his sinus symptoms have improved back to normal.  His main complaint is still that his ears are clogged    On exam it looks like he has improved pharyngeal erythema and no purulence in his nasal cavities.  Appeared that he had an effusion on the left side with slight retraction on the right    Appears that the sinusitis is resolved but is left with middle ear effusion on the left side and retraction on the right.  Discussed that this is usually the last thing to get better.  He should continue the Flonase and Sudafed and if not improved in 3 to 4 weeks would like to see him at the [facility] for tympanograms to consider myringotomy.    2. Fluid level behind tympanic membrane of left ear        The patient indicates understanding of these issues and agrees to the plan.      EXAM:   There were no vitals taken for this visit.    Pertinent exam findings may also be noted above in assessment and plan     System Details   Skin Inspection - Normal.   Constitutional Overall appearance - Normal.   Head/Face Symmetric, TMJ tenderness not present    Eyes EOMI, PERRL   Right ear:  Canal clear, TM intact, no PAT   Left ear:  Canal clear, TM intact, no PAT   Nose: Septum midline, inferior turbinates not enlarged, nasal valves without collapse    Oral cavity/Oropharynx: No lesions or masses on inspection or palpation, tonsils symmetric    Neck: Soft without LAD, thyroid not enlarged  Voice clear/ no stridor   Other:      Scopes and Procedures:             Current Outpatient Medications   Medication Sig Dispense Refill     atorvastatin 20 MG Oral Tab Take 1 tablet (20 mg total) by mouth daily.      fluticasone-salmeterol 100-50 MCG/ACT Inhalation Aerosol Powder, Breath Activated Inhale 1 puff into the lungs 2 (two) times daily.      gentamicin 0.3 % Ophthalmic Solution       lisinopril 20 MG Oral Tab Take 1 tablet (20 mg total) by mouth daily.      metFORMIN  MG Oral Tablet 24 Hr Take 1 tablet (750 mg total) by mouth daily with dinner.      montelukast 10 MG Oral Tab Take 1 tablet (10 mg total) by mouth daily.      sulfamethoxazole-trimethoprim -160 MG Oral Tab per tablet Take 1 tablet by mouth every 12 (twelve) hours for 14 days. 28 tablet 0    loratadine-pseudoephedrine ER  MG Oral Tablet 24 Hr Take 1 tablet by mouth at bedtime. 30 tablet 1    topiramate 25 MG Oral Tab Take 1 tablet (25 mg total) by mouth 2 (two) times daily. 180 tablet 1    guaiFENesin-codeine (CHERATUSSIN AC) 100-10 MG/5ML Oral Solution Take 5 mL by mouth every 6 (six) hours as needed for cough. 80 mL 0    guaiFENesin-codeine (CHERATUSSIN AC) 100-10 MG/5ML Oral Solution Take 5 mL by mouth every 6 (six) hours as needed for cough. 50 mL 0    Spacer/Aero-Holding Chambers Does not apply Device Use with albuterol inhaler 1 each 0    ALPRAZolam 0.5 MG Oral Tab Take 1 tablet (0.5 mg total) by mouth daily as needed.      albuterol 108 (90 Base) MCG/ACT Inhalation Aero Soln Inhale into the lungs every 6 (six) hours as needed for Wheezing.      Lisinopril-hydroCHLOROthiazide 20-25 MG Oral Tab Take 1 tablet by mouth daily.      Sertraline HCl 50 MG Oral Tab Take 1 tablet (50 mg total) by mouth every morning.      predniSONE 20 MG Oral Tab Take 1 tablet (20 mg total) by mouth daily. (Patient not taking: Reported on 2/26/2024)        Past Medical History:   Diagnosis Date    Anxiety     Depression       Social History:  Social History     Socioeconomic History    Marital status:    Tobacco Use    Smoking status: Never    Smokeless tobacco: Never    Vaping Use    Vaping Use: Never used   Substance and Sexual Activity    Alcohol use: Not Currently    Drug use: Not Currently          Rene Lizama MD  3/8/2024  1:40 PM

## 2024-03-29 ENCOUNTER — OFFICE VISIT (OUTPATIENT)
Dept: OTOLARYNGOLOGY | Facility: CLINIC | Age: 39
End: 2024-03-29

## 2024-03-29 DIAGNOSIS — H69.90 EUSTACHIAN TUBE DISORDER, UNSPECIFIED LATERALITY: Primary | ICD-10-CM

## 2024-03-29 DIAGNOSIS — H93.19 TINNITUS, UNSPECIFIED LATERALITY: ICD-10-CM

## 2024-03-29 PROCEDURE — 92567 TYMPANOMETRY: CPT | Performed by: STUDENT IN AN ORGANIZED HEALTH CARE EDUCATION/TRAINING PROGRAM

## 2024-03-29 PROCEDURE — 99213 OFFICE O/P EST LOW 20 MIN: CPT | Performed by: STUDENT IN AN ORGANIZED HEALTH CARE EDUCATION/TRAINING PROGRAM

## 2024-03-29 NOTE — PROGRESS NOTES
Varinder Stewart is a 38 year old male.   Chief Complaint   Patient presents with    Follow - Up     Patient is here due to eustachian tubes disorder follow up. Reports improvement in symptoms. Reports no longer having pulsing in ears. Reports still some ringing.        ASSESSMENT AND PLAN:   1. Eustachian tube disorder, unspecified laterality  38-year-old who I been following for eustachian tube dysfunction.  He states for the most part things are improving last pulsating sound in the left ear although still has tinnitus.  Was last seen on March 8 at that time was seen to have an effusion on the left    Exam today there is no more effusion slight retraction of both tympanic membranes.  Tympanograms with negative middle ear pressure in each ear    Appears that things are resolving although still slowly with regards to his eustachian tubes.  The effusions are resolved but still with negative middle ear pressure on the tympanograms.  Discussed that this will likely continue to take another several weeks.  Overall he is feeling better discussed continuing the Flonase and the Sudafed.  Can return in 1 to 2 months if still not improving    - Audiology Exam    2. Tinnitus, unspecified laterality        The patient indicates understanding of these issues and agrees to the plan.      EXAM:   There were no vitals taken for this visit.    Pertinent exam findings may also be noted above in assessment and plan     System Details   Skin Inspection - Normal.   Constitutional Overall appearance - Normal.   Head/Face Symmetric, TMJ tenderness not present    Eyes EOMI, PERRL   Right ear:  Canal clear, TM intact, no PAT   Left ear:  Canal clear, TM intact, no PAT   Nose: Septum midline, inferior turbinates not enlarged, nasal valves without collapse    Oral cavity/Oropharynx: No lesions or masses on inspection or palpation, tonsils symmetric    Neck: Soft without LAD, thyroid not enlarged  Voice clear/ no stridor   Other:      Scopes  and Procedures:             Current Outpatient Medications   Medication Sig Dispense Refill    atorvastatin 20 MG Oral Tab Take 1 tablet (20 mg total) by mouth daily.      fluticasone-salmeterol 100-50 MCG/ACT Inhalation Aerosol Powder, Breath Activated Inhale 1 puff into the lungs 2 (two) times daily.      gentamicin 0.3 % Ophthalmic Solution       lisinopril 20 MG Oral Tab Take 1 tablet (20 mg total) by mouth daily.      metFORMIN  MG Oral Tablet 24 Hr Take 1 tablet (750 mg total) by mouth daily with dinner.      montelukast 10 MG Oral Tab Take 1 tablet (10 mg total) by mouth daily.      predniSONE 20 MG Oral Tab Take 1 tablet (20 mg total) by mouth daily.      loratadine-pseudoephedrine ER  MG Oral Tablet 24 Hr Take 1 tablet by mouth at bedtime. 30 tablet 1    topiramate 25 MG Oral Tab Take 1 tablet (25 mg total) by mouth 2 (two) times daily. 180 tablet 1    guaiFENesin-codeine (CHERATUSSIN AC) 100-10 MG/5ML Oral Solution Take 5 mL by mouth every 6 (six) hours as needed for cough. 80 mL 0    guaiFENesin-codeine (CHERATUSSIN AC) 100-10 MG/5ML Oral Solution Take 5 mL by mouth every 6 (six) hours as needed for cough. 50 mL 0    Spacer/Aero-Holding Chambers Does not apply Device Use with albuterol inhaler 1 each 0    ALPRAZolam 0.5 MG Oral Tab Take 1 tablet (0.5 mg total) by mouth daily as needed.      albuterol 108 (90 Base) MCG/ACT Inhalation Aero Soln Inhale into the lungs every 6 (six) hours as needed for Wheezing.      Lisinopril-hydroCHLOROthiazide 20-25 MG Oral Tab Take 1 tablet by mouth daily.      Sertraline HCl 50 MG Oral Tab Take 1 tablet (50 mg total) by mouth every morning.        Past Medical History:   Diagnosis Date    Anxiety     Depression       Social History:  Social History     Socioeconomic History    Marital status:    Tobacco Use    Smoking status: Never    Smokeless tobacco: Never   Vaping Use    Vaping Use: Never used   Substance and Sexual Activity    Alcohol use: Not  Currently    Drug use: Not Currently          Rene Lizama MD  3/29/2024  9:07 AM

## 2024-04-17 ENCOUNTER — LAB ENCOUNTER (OUTPATIENT)
Dept: LAB | Age: 39
End: 2024-04-17
Attending: FAMILY MEDICINE
Payer: COMMERCIAL

## 2024-04-17 DIAGNOSIS — E11.9 DIABETES MELLITUS (HCC): Primary | ICD-10-CM

## 2024-04-17 LAB
ALBUMIN SERPL-MCNC: 4.8 G/DL (ref 3.2–4.8)
ALBUMIN/GLOB SERPL: 1.5 {RATIO} (ref 1–2)
ALP LIVER SERPL-CCNC: 96 U/L
ALT SERPL-CCNC: 20 U/L
ANION GAP SERPL CALC-SCNC: 7 MMOL/L (ref 0–18)
AST SERPL-CCNC: 21 U/L (ref ?–34)
BILIRUB SERPL-MCNC: 1.2 MG/DL (ref 0.3–1.2)
BUN BLD-MCNC: 22 MG/DL (ref 9–23)
BUN/CREAT SERPL: 21.2 (ref 10–20)
CALCIUM BLD-MCNC: 9.9 MG/DL (ref 8.7–10.4)
CHLORIDE SERPL-SCNC: 109 MMOL/L (ref 98–112)
CHOLEST SERPL-MCNC: 146 MG/DL (ref ?–200)
CO2 SERPL-SCNC: 26 MMOL/L (ref 21–32)
CREAT BLD-MCNC: 1.04 MG/DL
EGFRCR SERPLBLD CKD-EPI 2021: 95 ML/MIN/1.73M2 (ref 60–?)
FASTING PATIENT LIPID ANSWER: YES
FASTING STATUS PATIENT QL REPORTED: YES
GLOBULIN PLAS-MCNC: 3.3 G/DL (ref 2.8–4.4)
GLUCOSE BLD-MCNC: 112 MG/DL (ref 70–99)
HDLC SERPL-MCNC: 40 MG/DL (ref 40–59)
LDLC SERPL CALC-MCNC: 78 MG/DL (ref ?–100)
NONHDLC SERPL-MCNC: 106 MG/DL (ref ?–130)
OSMOLALITY SERPL CALC.SUM OF ELEC: 298 MOSM/KG (ref 275–295)
POTASSIUM SERPL-SCNC: 4.5 MMOL/L (ref 3.5–5.1)
PROT SERPL-MCNC: 8.1 G/DL (ref 5.7–8.2)
SODIUM SERPL-SCNC: 142 MMOL/L (ref 136–145)
TRIGL SERPL-MCNC: 162 MG/DL (ref 30–149)
VLDLC SERPL CALC-MCNC: 25 MG/DL (ref 0–30)

## 2024-04-17 PROCEDURE — 36415 COLL VENOUS BLD VENIPUNCTURE: CPT

## 2024-04-17 PROCEDURE — 80061 LIPID PANEL: CPT

## 2024-04-17 PROCEDURE — 80053 COMPREHEN METABOLIC PANEL: CPT

## 2024-04-18 ENCOUNTER — HOSPITAL ENCOUNTER (OUTPATIENT)
Age: 39
Discharge: HOME | End: 2024-04-18
Payer: COMMERCIAL

## 2024-04-18 VITALS
WEIGHT: 245 LBS | TEMPERATURE: 99 F | HEART RATE: 100 BPM | DIASTOLIC BLOOD PRESSURE: 81 MMHG | BODY MASS INDEX: 38.45 KG/M2 | HEIGHT: 67 IN | RESPIRATION RATE: 20 BRPM | OXYGEN SATURATION: 97 % | SYSTOLIC BLOOD PRESSURE: 155 MMHG

## 2024-04-18 DIAGNOSIS — R31.9 HEMATURIA, UNSPECIFIED TYPE: Primary | ICD-10-CM

## 2024-04-18 DIAGNOSIS — R35.0 URINE FREQUENCY: ICD-10-CM

## 2024-04-18 LAB
BILIRUB UR QL STRIP: NEGATIVE
CLARITY UR: CLEAR
COLOR UR: YELLOW
GLUCOSE UR STRIP-MCNC: NEGATIVE MG/DL
KETONES UR STRIP-MCNC: NEGATIVE MG/DL
LEUKOCYTE ESTERASE UR QL STRIP: NEGATIVE
NITRITE UR QL STRIP: NEGATIVE
PH UR STRIP: 6 [PH]
PROT UR STRIP-MCNC: NEGATIVE MG/DL
SP GR UR STRIP: 1.01
UROBILINOGEN UR STRIP-ACNC: <2 MG/DL

## 2024-04-18 PROCEDURE — 81002 URINALYSIS NONAUTO W/O SCOPE: CPT | Performed by: NURSE PRACTITIONER

## 2024-04-18 PROCEDURE — 99213 OFFICE O/P EST LOW 20 MIN: CPT | Performed by: NURSE PRACTITIONER

## 2024-04-18 NOTE — ED PROVIDER NOTES
Patient Seen in: [facility]      History     Chief Complaint   Patient presents with    Urinary Symptoms     Stated Complaint: EVal G    Subjective:   HPI    38-year-old male presents to the Medicare with urine frequency.  Pay states he is very high anxiety and and tends to overthink lots of things.  Pay states he will get checked for UTI his dad was was in the hospital for 2 weeks for some type of infection and now thinks maybe he has a urinary tract infection.  Denies abdominal pain or flank pain.  No fever.  No scrotal pain and swelling no penile discharge.  The patient's medication list, past medical history and social history elements as listed in today's nurse's notes were reviewed and agreed (except as otherwise stated in the HPI).  The patient's family history reviewed and determined to be noncontributory to the presenting problem.      Objective:   Past Medical History:    Anxiety    Depression              History reviewed. No pertinent surgical history.             Social History     Socioeconomic History    Marital status:    Tobacco Use    Smoking status: Never     Passive exposure: Never    Smokeless tobacco: Never   Vaping Use    Vaping status: Never Used   Substance and Sexual Activity    Alcohol use: Not Currently    Drug use: Not Currently              Review of Systems    Positive for stated complaint: EVal G  Other systems are as noted in HPI.  Constitutional and vital signs reviewed.      All other systems reviewed and negative except as noted above.    Physical Exam     ED Triage Vitals [04/18/24 1633]   /81   Pulse 100   Resp 20   Temp 98.7 °F (37.1 °C)   Temp src Temporal   SpO2 97 %   O2 Device None (Room air)       Current:/81   Pulse 100   Temp 98.7 °F (37.1 °C) (Temporal)   Resp 20   Ht 170.2 cm (5' 7\")   Wt 111.1 kg   SpO2 97%   BMI 38.37 kg/m²         Physical Exam    GENERAL: The patient is well-developed well-nourished nontoxic,  non-ill-appearing  HEENT: Normocephalic.  Atraumatic.  Extraocular motions are intact  NECK: Supple.  No meningitic signs are noted.   CHEST/LUNGS: Clear to auscultation.  There is no respiratory distress noted.  HEART/CARDIOVASCULAR: Regular.  There is no tachycardia.   SKIN: There is no rash.  NEURO: The patient is awake, alert, and oriented.  The patient is cooperative.    ED Course     Labs Reviewed   [facility] POCT URINALYSIS DIPSTICK - Abnormal; Notable for the following components:       Result Value    Blood, Urine Trace-Intact (*)     All other components within normal limits                      MDM   Pertinent Labs & Imaging studies reviewed. (See chart for details)  Differential diagnosis considered but not limited to: Hematuria cystitis pyelonephritis  Patient coming in with urine frequency. Patient provided with pain medication. Labs reviewed see above.  . Will treat for hematuria unknown cause. Will discharge on over-the-counter supportive care see discharge note for instructions. Patient is comfortable with this plan.  Overall Pt looks good. Non-toxic, well-hydrated and in no respiratory distress. Vital signs are reassuring. Exam is reassuring. I do not believe pt  requires and additional  diagnostic studiesor intervention. I believe pt  can be discharged home to continue evaluation as an outpatient. Follow-up provider given. Discharge instructions given and reviewed. Return for any problems. All understand and agreewith the plan.    Please note that this report has been produced using speech recognition software and may contain errors related to that system including, but not limited to, errors in grammar, punctuation, and spelling, as well as words and phrases that possibly may have been recognized inappropriately.  If there are any questions or concerns, contact the dictating provider for clarification.    Note to patient: The 21st Century Cures Act makes medical notes like these available to patients  in the interest of transparency. However, this is a medical document intended as peer to peer communication. It is written in medical language and may contain abbreviations or verbiage that are unfamiliar. It may appear blunt or direct. Medical documents are intended to carry relevant information, facts as evident, and the clinical opinion of the practitioner.                                      Medical Decision Making      Disposition and Plan     Clinical Impression:  1. Hematuria, unspecified type    2. Urine frequency         Disposition:  Discharge  4/18/2024  6:10 pm    Follow-up:  No follow-up provider specified.        Medications Prescribed:  Discharge Medication List as of 4/18/2024  4:52 PM

## 2024-06-10 ENCOUNTER — APPOINTMENT (OUTPATIENT)
Dept: GENERAL RADIOLOGY | Age: 39
End: 2024-06-10
Attending: PHYSICIAN ASSISTANT
Payer: COMMERCIAL

## 2024-06-10 ENCOUNTER — HOSPITAL ENCOUNTER (OUTPATIENT)
Age: 39
Discharge: HOME | End: 2024-06-10
Payer: COMMERCIAL

## 2024-06-10 VITALS
BODY MASS INDEX: 37.67 KG/M2 | OXYGEN SATURATION: 97 % | HEART RATE: 111 BPM | RESPIRATION RATE: 22 BRPM | WEIGHT: 240 LBS | HEIGHT: 67 IN | TEMPERATURE: 97 F | SYSTOLIC BLOOD PRESSURE: 141 MMHG | DIASTOLIC BLOOD PRESSURE: 74 MMHG

## 2024-06-10 DIAGNOSIS — J18.9 LINGULAR PNEUMONIA: ICD-10-CM

## 2024-06-10 DIAGNOSIS — R50.9 FEVER: Primary | ICD-10-CM

## 2024-06-10 LAB
POCT INFLUENZA A: NEGATIVE
POCT INFLUENZA B: NEGATIVE
SARS-COV-2 RNA RESP QL NAA+PROBE: NOT DETECTED

## 2024-06-10 PROCEDURE — 87502 INFLUENZA DNA AMP PROBE: CPT | Performed by: PHYSICIAN ASSISTANT

## 2024-06-10 PROCEDURE — U0002 COVID-19 LAB TEST NON-CDC: HCPCS | Performed by: PHYSICIAN ASSISTANT

## 2024-06-10 PROCEDURE — 99214 OFFICE O/P EST MOD 30 MIN: CPT | Performed by: PHYSICIAN ASSISTANT

## 2024-06-10 PROCEDURE — 71046 X-RAY EXAM CHEST 2 VIEWS: CPT | Performed by: PHYSICIAN ASSISTANT

## 2024-06-10 RX ORDER — CODEINE PHOSPHATE AND GUAIFENESIN 10; 100 MG/5ML; MG/5ML
5 SOLUTION ORAL EVERY 6 HOURS PRN
Qty: 100 ML | Refills: 0 | Status: SHIPPED | OUTPATIENT
Start: 2024-06-10 | End: 2024-06-15

## 2024-06-10 RX ORDER — AMOXICILLIN 875 MG/1
875 TABLET, COATED ORAL 2 TIMES DAILY
Qty: 20 TABLET | Refills: 0 | Status: SHIPPED | OUTPATIENT
Start: 2024-06-10 | End: 2024-06-20

## 2024-06-10 RX ORDER — PREDNISONE 20 MG/1
40 TABLET ORAL DAILY
Qty: 10 TABLET | Refills: 0 | Status: SHIPPED | OUTPATIENT
Start: 2024-06-10 | End: 2024-06-15

## 2024-06-10 RX ORDER — AZITHROMYCIN 250 MG/1
TABLET, FILM COATED ORAL
Qty: 6 TABLET | Refills: 0 | Status: SHIPPED | OUTPATIENT
Start: 2024-06-10 | End: 2024-06-15

## 2024-06-10 RX ORDER — BENZONATATE 100 MG/1
100 CAPSULE ORAL 3 TIMES DAILY PRN
Qty: 20 CAPSULE | Refills: 0 | Status: SHIPPED | OUTPATIENT
Start: 2024-06-10

## 2024-06-10 NOTE — ED INITIAL ASSESSMENT (HPI)
Pt c/o cough, congestion and fever; rpt fever Thursday night, got better, and then fever returned Saturday night; states he has been taking benadryl & motrin

## 2024-06-10 NOTE — DISCHARGE INSTRUCTIONS
Complete antibiotics as instructed.  Go to ER for new/worsening symptoms (ie difficulty breathing, weakness, etc)   Patient is having pain in her upper thigh, she states she has been losing feeling in it.    Would like to be seen today, please advise

## 2024-06-10 NOTE — ED PROVIDER NOTES
Patient Seen in: [facility]      History     Chief Complaint   Patient presents with    Cough/URI     Stated Complaint: cough, fever    Subjective:   HPI    Patient states approximately 4 to 5 days ago he developed congestion, cough, fevers, chills.  Tmax 102 Fahrenheit at home.  States that he had fevers for approximately 24 hours and then redeveloped a fever again 2 nights ago.  States his son recently with a viral illness and fevers of 105 at home and is doing better now.  Patient has persistent cough that is occasionally productive with sputum.  Denies chest pain or shortness of breath.  Patient has asthma and typically uses albuterol inhalers as needed but since the symptoms started he restarted his Singulair and fluticasone salmeterol inhaler as well.  Denies any other complaints or concerns at this time.    Objective:   Past Medical History:    Anxiety    Depression              History reviewed. No pertinent surgical history.             Social History     Socioeconomic History    Marital status:    Tobacco Use    Smoking status: Never     Passive exposure: Never    Smokeless tobacco: Never   Vaping Use    Vaping status: Never Used   Substance and Sexual Activity    Alcohol use: Not Currently    Drug use: Not Currently              Review of Systems    Positive for stated complaint: cough, fever  Other systems are as noted in HPI.  Constitutional and vital signs reviewed.      All other systems reviewed and negative except as noted above.    Physical Exam     ED Triage Vitals [06/10/24 1100]   /74   Pulse 111   Resp 22   Temp 97.1 °F (36.2 °C)   Temp src Temporal   SpO2 97 %   O2 Device None (Room air)       Current Vitals:   Vital Signs  BP: 141/74  Pulse: 111  Resp: 22  Temp: 97.1 °F (36.2 °C)  Temp src: Temporal    Oxygen Therapy  SpO2: 97 %  O2 Device: None (Room air)            Physical Exam  Vitals and nursing note reviewed.   Constitutional:       Appearance: Normal  appearance.   HENT:      Right Ear: Tympanic membrane normal.      Left Ear: Tympanic membrane normal.      Mouth/Throat:      Mouth: Mucous membranes are moist.   Eyes:      Conjunctiva/sclera: Conjunctivae normal.   Cardiovascular:      Rate and Rhythm: Normal rate and regular rhythm.   Pulmonary:      Effort: Pulmonary effort is normal. No respiratory distress.      Breath sounds: No stridor. Wheezing (Scattered wheezes noted diffusely bilaterally) present.   Musculoskeletal:         General: Normal range of motion.   Skin:     General: Skin is warm and dry.   Neurological:      General: No focal deficit present.      Mental Status: He is alert.   Psychiatric:         Mood and Affect: Mood normal.               ED Course     Labs Reviewed   POCT FLU TEST - Normal    Narrative:     This assay is a rapid molecular in vitro test utilizing nucleic acid amplification of influenza A and B viral RNA.   RAPID SARS-COV-2 BY PCR - Normal             XR CHEST PA + LAT CHEST (CPT=71046)    Result Date: 6/10/2024  PROCEDURE:  XR CHEST PA + LAT CHEST (CPT=71046)  INDICATIONS:  cough, fever  COMPARISON:  None.  TECHNIQUE:  PA and lateral chest radiographs were obtained.  PATIENT STATED HISTORY: (As transcribed by Technologist)  Congestion with cough and on and off fever.              CONCLUSION:   Normal cardiac and mediastinal contours.  Patchy opacity of left lung base on the frontal view may correspond to the lingula on the lateral.  Given clinical history of cough and fever this is suspicious for a focus of pneumonia.  No associated pleural effusion or pneumothorax.   LOCATION:  [facility]   Dictated by (CST): Cassandra Mahmood MD on 6/10/2024 at 11:41 AM     Finalized by (CST): Cassandra Mahmood MD on 6/10/2024 at 11:42 AM          I reviewed images personally and see evidence of opacity LLL.         MDM      Differential diagnosis includes but is not limited to COVID, influenza, URI, bronchitis, asthma exacerbation, pneumonia.    Patient  well-appearing, nontoxic.  Lungs with diffuse wheezes bilaterally.  Discussed flu and covid negative.  CXR consistent with pneumonia and plan to treat with antibiotics outpatient.  I advised continued use of inhalers and singulair as instructed and other supportive care at home.  I advised f/u with PCP and provided ER precautions.  Patient verbalized understanding/agreement of plan.      This report has been produced using speech recognition software and may contain errors related to that system including, but not limited to, errors in grammar, punctuation, and spelling, as well as words and phrases that possibly may have been recognized inappropriately.  If there are any questions or concerns, contact the dictating provider for clarification.     NOTE: The 21st Century Cares Act makes medical notes available to patients.  Be advised that this is a medical document written in medical language and may contain abbreviations or verbiage that is unfamiliar or direct.  It is primarily intended to carry relevant historical information, physical exam findings, and the clinical assessment of the physician.                                     Medical Decision Making      Disposition and Plan     Clinical Impression:  1. Fever    2. Lingular pneumonia         Disposition:  Discharge  6/10/2024 11:54 am    Follow-up:  Clive Gracia MD  [address] [address]  [phone]    In 3 days            Medications Prescribed:  Discharge Medication List as of 6/10/2024 12:06 PM        START taking these medications    Details   amoxicillin 875 MG Oral Tab Take 1 tablet (875 mg total) by mouth 2 (two) times daily for 10 days., Normal, Disp-20 tablet, R-0      azithromycin (ZITHROMAX Z-JOAQUINA) 250 MG Oral Tab 500 mg once followed by 250 mg daily x 4 days, Normal, Disp-6 tablet, R-0      !! predniSONE 20 MG Oral Tab Take 2 tablets (40 mg total) by mouth daily for 5 days., Normal, Disp-10 tablet, R-0      benzonatate 100  MG Oral Cap Take 1 capsule (100 mg total) by mouth 3 (three) times daily as needed for cough., Normal, Disp-20 capsule, R-0      !! guaiFENesin-codeine (CHERATUSSIN AC) 100-10 MG/5ML Oral Solution Take 5 mL by mouth every 6 (six) hours as needed for cough., Normal, Disp-100 mL, R-0       !! - Potential duplicate medications found. Please discuss with provider.

## 2024-10-09 ENCOUNTER — HOSPITAL ENCOUNTER (OUTPATIENT)
Age: 39
Discharge: HOME | End: 2024-10-09
Attending: EMERGENCY MEDICINE
Payer: COMMERCIAL

## 2024-10-09 ENCOUNTER — APPOINTMENT (OUTPATIENT)
Dept: GENERAL RADIOLOGY | Age: 39
End: 2024-10-09
Attending: EMERGENCY MEDICINE
Payer: COMMERCIAL

## 2024-10-09 VITALS
RESPIRATION RATE: 20 BRPM | HEART RATE: 98 BPM | TEMPERATURE: 99 F | DIASTOLIC BLOOD PRESSURE: 74 MMHG | OXYGEN SATURATION: 99 % | SYSTOLIC BLOOD PRESSURE: 144 MMHG

## 2024-10-09 DIAGNOSIS — J98.01 BRONCHOSPASM: ICD-10-CM

## 2024-10-09 DIAGNOSIS — J06.9 VIRAL URI: Primary | ICD-10-CM

## 2024-10-09 LAB — SARS-COV-2 RNA RESP QL NAA+PROBE: NOT DETECTED

## 2024-10-09 PROCEDURE — 99214 OFFICE O/P EST MOD 30 MIN: CPT

## 2024-10-09 PROCEDURE — 71046 X-RAY EXAM CHEST 2 VIEWS: CPT | Performed by: EMERGENCY MEDICINE

## 2024-10-09 RX ORDER — PREDNISONE 20 MG/1
40 TABLET ORAL DAILY
Qty: 10 TABLET | Refills: 0 | Status: SHIPPED | OUTPATIENT
Start: 2024-10-09 | End: 2024-10-14

## 2024-10-09 NOTE — ED PROVIDER NOTES
Patient Seen in: [facility]      History     Chief Complaint   Patient presents with    Cough/URI     Stated Complaint: Whezing    Subjective:   HPI      The patient is a 39-year-old male with past history of hypertension, diabetes, pneumonia in 6/24 who presents now with cough, wheezing, nasal congestion.  The patient states the symptoms started approximately 1 week ago.  Patient denies any chest pain.  Patient states he has noted some wheezing at home.  The patient states he is using his inhalers as prescribed.  Patient is concerned that he might have pneumonia again though he states that his nasal discharge is clear where it was discolored the last time he had pneumonia.    Objective:     Past Medical History:    Anxiety    Depression              History reviewed. No pertinent surgical history.             No pertinent social history.            Review of Systems    Positive for stated complaint: Whezing  Other systems are as noted in HPI.  Constitutional and vital signs reviewed.      All other systems reviewed and negative except as noted above.    Physical Exam     ED Triage Vitals [10/09/24 1027]   /74   Pulse 98   Resp 20   Temp 98.7 °F (37.1 °C)   Temp src Temporal   SpO2 99 %   O2 Device None (Room air)       Current Vitals:   Vital Signs  BP: 144/74  Pulse: 98  Resp: 20  Temp: 98.7 °F (37.1 °C)  Temp src: Temporal    Oxygen Therapy  SpO2: 99 %  O2 Device: None (Room air)        Physical Exam    Constitutional: Well-developed well-nourished in no acute distress  Head: Normocephalic, no swelling or tenderness  Eyes: Nonicteric sclera, no conjunctival injection  ENT: TMs are clear and flat bilaterally.  There is no posterior pharyngeal erythema  Chest: Trace scattered wheezing, no tenderness  Cardiovascular: Regular rate and rhythm without murmur  Abdomen: Soft, nontender and nondistended  Neurologic: Patient is awake, alert and oriented ×3.  The patient's motor strength is 5 out of 5  and symmetric in the upper and lower extremities bilaterally  Extremities: No focal swelling or tenderness  Skin: No pallor, no redness or warmth to the touch      ED Course     Labs Reviewed   RAPID SARS-COV-2 BY PCR - Normal     Patient's chest x-ray was independently reviewed by myself.  There is no infiltrate noted.  Patient's negative COVID, unremarkable chest x-ray were reviewed with the patient.  Patient has benefited from steroids in the past for his bronchospasm.  He is aware that steroids will increase his sugars temporarily.  Patient understands need for risk and benefits of the prednisone and prefers treatment.       Pulse ox is 99% on room air, normal.  PERC score is 0       MDM      Viral URI with cough versus pneumonia versus COVID versus bronchospasm        Medical Decision Making      Disposition and Plan     Clinical Impression:  1. Viral URI    2. Bronchospasm         Disposition:  Discharge  10/9/2024 11:09 am    Follow-up:  Clive Gracia MD  [address]  [address] [address]  [phone]      As needed          Medications Prescribed:  Discharge Medication List as of 10/9/2024 11:11 AM              Supplementary Documentation:

## 2024-10-28 ENCOUNTER — LAB ENCOUNTER (OUTPATIENT)
Dept: LAB | Age: 39
End: 2024-10-28
Attending: FAMILY MEDICINE
Payer: COMMERCIAL

## 2024-10-28 DIAGNOSIS — E11.9 DIABETES MELLITUS (HCC): Primary | ICD-10-CM

## 2024-10-28 LAB
ALBUMIN SERPL-MCNC: 4.9 G/DL (ref 3.2–4.8)
ALBUMIN/GLOB SERPL: 1.7 {RATIO} (ref 1–2)
ALP LIVER SERPL-CCNC: 70 U/L
ALT SERPL-CCNC: 24 U/L
ANION GAP SERPL CALC-SCNC: 8 MMOL/L (ref 0–18)
AST SERPL-CCNC: 18 U/L (ref ?–34)
BILIRUB SERPL-MCNC: 1.1 MG/DL (ref 0.3–1.2)
BUN BLD-MCNC: 17 MG/DL (ref 9–23)
BUN/CREAT SERPL: 18.3 (ref 10–20)
CALCIUM BLD-MCNC: 10.1 MG/DL (ref 8.7–10.4)
CHLORIDE SERPL-SCNC: 102 MMOL/L (ref 98–112)
CHOLEST SERPL-MCNC: 176 MG/DL (ref ?–200)
CO2 SERPL-SCNC: 30 MMOL/L (ref 21–32)
CREAT BLD-MCNC: 0.93 MG/DL
CREAT UR-SCNC: 126 MG/DL
EGFRCR SERPLBLD CKD-EPI 2021: 108 ML/MIN/1.73M2 (ref 60–?)
EST. AVERAGE GLUCOSE BLD GHB EST-MCNC: 120 MG/DL (ref 68–126)
FASTING PATIENT LIPID ANSWER: YES
FASTING STATUS PATIENT QL REPORTED: YES
GLOBULIN PLAS-MCNC: 2.9 G/DL (ref 2–3.5)
GLUCOSE BLD-MCNC: 121 MG/DL (ref 70–99)
HBA1C MFR BLD: 5.8 % (ref ?–5.7)
HDLC SERPL-MCNC: 45 MG/DL (ref 40–59)
LDLC SERPL CALC-MCNC: 72 MG/DL (ref ?–100)
MICROALBUMIN UR-MCNC: 1.7 MG/DL
MICROALBUMIN/CREAT 24H UR-RTO: 13.5 UG/MG (ref ?–30)
NONHDLC SERPL-MCNC: 131 MG/DL (ref ?–130)
OSMOLALITY SERPL CALC.SUM OF ELEC: 293 MOSM/KG (ref 275–295)
POTASSIUM SERPL-SCNC: 4.4 MMOL/L (ref 3.5–5.1)
PROT SERPL-MCNC: 7.8 G/DL (ref 5.7–8.2)
SODIUM SERPL-SCNC: 140 MMOL/L (ref 136–145)
TRIGL SERPL-MCNC: 377 MG/DL (ref 30–149)
VLDLC SERPL CALC-MCNC: 58 MG/DL (ref 0–30)

## 2024-10-28 PROCEDURE — 83036 HEMOGLOBIN GLYCOSYLATED A1C: CPT

## 2024-10-28 PROCEDURE — 80053 COMPREHEN METABOLIC PANEL: CPT

## 2024-10-28 PROCEDURE — 82570 ASSAY OF URINE CREATININE: CPT

## 2024-10-28 PROCEDURE — 80061 LIPID PANEL: CPT

## 2024-10-28 PROCEDURE — 82043 UR ALBUMIN QUANTITATIVE: CPT

## 2024-10-28 PROCEDURE — 36415 COLL VENOUS BLD VENIPUNCTURE: CPT

## 2024-12-31 ENCOUNTER — HOSPITAL ENCOUNTER (OUTPATIENT)
Age: 39
Discharge: HOME | End: 2024-12-31
Payer: COMMERCIAL

## 2024-12-31 VITALS
TEMPERATURE: 98 F | BODY MASS INDEX: 39.24 KG/M2 | HEIGHT: 67 IN | WEIGHT: 250 LBS | SYSTOLIC BLOOD PRESSURE: 156 MMHG | RESPIRATION RATE: 20 BRPM | OXYGEN SATURATION: 97 % | HEART RATE: 121 BPM | DIASTOLIC BLOOD PRESSURE: 86 MMHG

## 2024-12-31 DIAGNOSIS — J22 LOWER RESPIRATORY INFECTION: Primary | ICD-10-CM

## 2024-12-31 RX ORDER — AZITHROMYCIN 250 MG/1
TABLET, FILM COATED ORAL
Qty: 6 TABLET | Refills: 0 | Status: SHIPPED | OUTPATIENT
Start: 2024-12-31

## 2024-12-31 RX ORDER — PREDNISONE 20 MG/1
40 TABLET ORAL DAILY
Qty: 10 TABLET | Refills: 0 | Status: SHIPPED | OUTPATIENT
Start: 2024-12-31 | End: 2025-01-05

## 2024-12-31 NOTE — ED PROVIDER NOTES
Patient Seen in: [facility]      History     Chief Complaint   Patient presents with    Ear Problem Pain     Stated Complaint: runny nose, ear pain and cough    Subjective:   39-year-old male presents for bilateral ear pain cough and wheezing.  Patient states that he has a history of eustachian tube dysfunction and when he gets sick he tends to get ear infections.  He also reports history of pneumonia with previous illnesses.  States he has been having increased wheezing using his inhalers more.        Objective:     Past Medical History:    Anxiety    Depression              History reviewed. No pertinent surgical history.             Social History     Socioeconomic History    Marital status:    Tobacco Use    Smoking status: Never     Passive exposure: Never    Smokeless tobacco: Never   Vaping Use    Vaping status: Never Used   Substance and Sexual Activity    Alcohol use: Not Currently    Drug use: Not Currently              Review of Systems   Constitutional: Negative.    Respiratory: Negative.     Cardiovascular: Negative.    Gastrointestinal: Negative.    Skin: Negative.    Neurological: Negative.        Positive for stated complaint: runny nose, ear pain and cough  Other systems are as noted in HPI.  Constitutional and vital signs reviewed.      All other systems reviewed and negative except as noted above.    Physical Exam     ED Triage Vitals [12/31/24 0804]   /86   Pulse (!) 121   Resp 20   Temp 97.8 °F (36.6 °C)   Temp src Oral   SpO2 97 %   O2 Device None (Room air)       Current Vitals:   Vital Signs  BP: 156/86  Pulse: (!) 121  Resp: 20  Temp: 97.8 °F (36.6 °C)  Temp src: Oral    Oxygen Therapy  SpO2: 97 %  O2 Device: None (Room air)        Physical Exam  Vitals and nursing note reviewed.   Constitutional:       General: He is not in acute distress.  HENT:      Head: Normocephalic.      Right Ear: Tympanic membrane is bulging.      Left Ear: Tympanic membrane is  bulging.   Cardiovascular:      Rate and Rhythm: Normal rate.   Pulmonary:      Effort: Pulmonary effort is normal.      Breath sounds: Examination of the right-upper field reveals wheezing. Examination of the left-upper field reveals wheezing. Examination of the right-lower field reveals rhonchi. Wheezing and rhonchi present.   Musculoskeletal:         General: Normal range of motion.   Skin:     General: Skin is warm and dry.   Neurological:      General: No focal deficit present.      Mental Status: He is alert and oriented to person, place, and time.             ED Course   Labs Reviewed - No data to display       MDM      Medical Decision Making  Pertinent Labs & Imaging studies reviewed. (See chart for details).  Patient coming in with cough, congestion, wheezing.   Differential diagnosis includes sinobronchitis, lower respiratory tract infection     Patient is comfortable with plan of care.     Overall Pt looks good. Non-toxic, well-hydrated and in no respiratory distress. Vital signs are reassuring. Exam is reassuring. I do not believe pt requires and additional diagnostic studies or intervention. I believe pt can be discharged home to continue evaluation as an outpatient. Follow-up provider given. Discharge instructions given and reviewed. Return for any problems. All understand and agrees with the plan.        Problems Addressed:  Lower respiratory infection: acute illness or injury    Risk  OTC drugs.  Prescription drug management.        Disposition and Plan     Clinical Impression:  1. Lower respiratory infection         Disposition:  Discharge  12/31/2024  8:24 am    Follow-up:  Clive Gracia MD  [address] [facility] [place]  [address] [address]  [phone]                Medications Prescribed:  Discharge Medication List as of 12/31/2024  8:28 AM        START taking these medications    Details   azithromycin (ZITHROMAX Z-JOAQUINA) 250 MG Oral Tab 500 mg once followed by 250 mg daily x 4 days, Normal, Disp-6  tablet, RRentamus                 Supplementary Documentation:

## 2025-02-27 ENCOUNTER — TELEMEDICINE (OUTPATIENT)
Dept: TELEHEALTH | Age: 40
End: 2025-02-27
Payer: COMMERCIAL

## 2025-02-27 DIAGNOSIS — J30.9 ALLERGIC SINUSITIS: Primary | ICD-10-CM

## 2025-02-27 DIAGNOSIS — T48.5X5A RHINITIS MEDICAMENTOSA: ICD-10-CM

## 2025-02-27 DIAGNOSIS — J31.0 RHINITIS MEDICAMENTOSA: ICD-10-CM

## 2025-02-27 PROBLEM — G47.11 IDIOPATHIC HYPERSOMNIA WITH LONG SLEEP TIME: Status: ACTIVE | Noted: 2018-02-28

## 2025-02-27 PROCEDURE — 98005 SYNCH AUDIO-VIDEO EST LOW 20: CPT | Performed by: PHYSICIAN ASSISTANT

## 2025-02-27 RX ORDER — PANTOPRAZOLE SODIUM 40 MG/1
40 TABLET, DELAYED RELEASE ORAL DAILY
COMMUNITY
Start: 2025-02-19

## 2025-02-27 RX ORDER — PREDNISONE 20 MG/1
40 TABLET ORAL DAILY
Qty: 10 TABLET | Refills: 0 | Status: SHIPPED | OUTPATIENT
Start: 2025-02-27 | End: 2025-03-04

## 2025-02-27 RX ORDER — SERTRALINE HYDROCHLORIDE 100 MG/1
100 TABLET, FILM COATED ORAL EVERY MORNING
COMMUNITY
Start: 2025-01-06

## 2025-02-27 NOTE — PROGRESS NOTES
Virtual/Telephone Check-In    Varinder Stewart verbally consents to a Virtual/Telephone Check-In service on 02/27/25.  Patient has been referred to the [facility] website at [email or web address] to review the yearly Consent to Treat document.  Patient understands and accepts financial responsibility for any deductible, co-insurance and/or co-pays associated with this service.       Telehealth Verbal Consent   I conducted a telehealth visit with Varinder Stewart today, 02/27/25, which was completed using two-way, real-time interactive audio and video communication. This has been done in good mya to provide continuity of care in the best interest of the provider-patient relationship, due to the COVID -19 public health crisis/national emergency where restrictions of face-to-face office visits are ongoing. Every conscious effort was taken to allow for sufficient and adequate time to complete the visit.  The patient was made aware of the limitations of the telehealth visit, including treatment limitations as no physical exam could be performed.  The patient was advised to call 911 or to go to the ER in case there was an emergency.  The patient was also advised of the potential privacy & security concerns related to the telehealth platform.   The patient was made aware of where to find [facility]'s notice of privacy practices, telehealth consent form and other related consent forms and documents.  which are located on the [facility] website. The patient verbally agreed to telehealth consent form, related consents and the risks discussed.    Lastly, the patient confirmed that they were in [place].   Included in this visit, time may have been spent reviewing labs, medications, radiology tests and decision making. Appropriate medical decision-making and tests are ordered as detailed in the plan of care above.  Coding/billing information is submitted for this visit based on complexity of care and/or time spent for the visit.    CHIEF  COMPLAINT:     Chief Complaint   Patient presents with    Sinus Problem       HPI:   Varinder Stewart is a 39 year old male who presents for a video visit.  Patient reports feeling unwell for last few weeks.  Reports seasonal allergies have been really bad.  Reports now having lots pressure/fullness in ears.  Reports having lots of discolored nasal mucus.  All symptoms began 3 days ago.  Patient has been using Afrin equivalent every few hours for the last 3 days.  He is worried about this progressing into bronchitis or pneumonia.  He does not have any fever.  Minimal cough currently. Asthma is well controlled currently    Current Outpatient Medications   Medication Sig Dispense Refill    pantoprazole 40 MG Oral Tab EC Take 1 tablet (40 mg total) by mouth daily.      sertraline 100 MG Oral Tab Take 1 tablet (100 mg total) by mouth every morning.      atorvastatin 20 MG Oral Tab Take 1 tablet (20 mg total) by mouth daily.      fluticasone-salmeterol 100-50 MCG/ACT Inhalation Aerosol Powder, Breath Activated Inhale 1 puff into the lungs 2 (two) times daily.      lisinopril 20 MG Oral Tab Take 1 tablet (20 mg total) by mouth daily.      metFORMIN  MG Oral Tablet 24 Hr Take 1 tablet (750 mg total) by mouth daily with dinner.      montelukast 10 MG Oral Tab Take 1 tablet (10 mg total) by mouth daily.      loratadine-pseudoephedrine ER  MG Oral Tablet 24 Hr Take 1 tablet by mouth at bedtime. 30 tablet 1    topiramate 25 MG Oral Tab Take 1 tablet (25 mg total) by mouth 2 (two) times daily. 180 tablet 1    guaiFENesin-codeine (CHERATUSSIN AC) 100-10 MG/5ML Oral Solution Take 5 mL by mouth every 6 (six) hours as needed for cough. (Patient not taking: Reported on 12/31/2024) 80 mL 0    ALPRAZolam 0.5 MG Oral Tab Take 1 tablet (0.5 mg total) by mouth daily as needed.      albuterol 108 (90 Base) MCG/ACT Inhalation Aero Soln Inhale into the lungs every 6 (six) hours as needed for Wheezing.        Past Medical  History:    Anxiety    Depression      History reviewed. No pertinent surgical history.      Social History     Socioeconomic History    Marital status:    Tobacco Use    Smoking status: Never     Passive exposure: Never    Smokeless tobacco: Never   Vaping Use    Vaping status: Never Used   Substance and Sexual Activity    Alcohol use: Not Currently    Drug use: Not Currently         REVIEW OF SYSTEMS:   GENERAL: see HPI  SKIN: no rashes or abnormal skin lesions  HEENT: See HPI  LUNGS: denies shortness of breath or wheezing, See HPI  CARDIOVASCULAR: denies chest pain or palpitations     EXAM:   General: Alert, Ill-appearing/sounding, and In no acute distress  Respiratory:   Speaking in full sentences comfortably  Normal work of breathing  No cough during visit  Head: Normocephalic  Nose: sounds very nasally/congested, wiping nose frequently throughout exam  Skin: No obvious rashes or lesions from what observed.     No results found for this or any previous visit (from the past 24 hours).    ASSESSMENT AND PLAN:   Varinder Stewart is a 39 year old male who presents with symptoms that are consistent with    ASSESSMENT:   Encounter Diagnoses   Name Primary?    Allergic sinusitis Yes    Rhinitis medicamentosa        PLAN: Discussed after 72 hours extremely unlike that he has bacterial infection. Likely combo of allergic sinusitis and prolonged use of nasal decongestant.  Continue Mucinex DM, Flonase, saline rinses.  Start oral steroid as below.  See patient Instructions    Meds & Refills for this Visit:  Requested Prescriptions     Signed Prescriptions Disp Refills    predniSONE 20 MG Oral Tab 10 tablet 0     Sig: Take 2 tablets (40 mg total) by mouth daily for 5 days.       Risks, benefits, and side effects of medication explained and discussed.    The patient indicates understanding of these issues and agrees to the plan.  The patient is asked to return if sx's persist or worsen.    Face to face time spent on  Video Visit: 11  Total Time spent on visit including reviewing history, ordering labs/medication, patient examination and education: 16    Varinder Setwart understands video visit evaluation is not a substitute for face-to-face examination or emergency care. Patient advised to go to ER or call 911 for worsening symptoms or acute distress.

## 2025-03-04 ENCOUNTER — LAB REQUISITION (OUTPATIENT)
Dept: LAB | Age: 40
End: 2025-03-04

## 2025-03-04 DIAGNOSIS — E11.9 TYPE 2 DIABETES MELLITUS WITHOUT COMPLICATIONS (CMD): ICD-10-CM

## 2025-04-09 ENCOUNTER — LAB SERVICES (OUTPATIENT)
Dept: LAB | Age: 40
End: 2025-04-09

## 2025-04-09 PROCEDURE — 83036 HEMOGLOBIN GLYCOSYLATED A1C: CPT | Performed by: CLINICAL MEDICAL LABORATORY

## 2025-04-09 PROCEDURE — 80061 LIPID PANEL: CPT | Performed by: CLINICAL MEDICAL LABORATORY

## 2025-04-09 PROCEDURE — PSEU8135 LIPID PANEL WITH REFLEX: Performed by: CLINICAL MEDICAL LABORATORY

## 2025-04-09 PROCEDURE — 80053 COMPREHEN METABOLIC PANEL: CPT | Performed by: CLINICAL MEDICAL LABORATORY

## 2025-04-09 PROCEDURE — PSEU8266 GLYCOHEMOGLOBIN: Performed by: CLINICAL MEDICAL LABORATORY

## 2025-04-09 PROCEDURE — PSEU8250 COMPREHENSIVE METABOLIC PANEL: Performed by: CLINICAL MEDICAL LABORATORY

## 2025-04-10 LAB
ALBUMIN SERPL-MCNC: 4.3 G/DL (ref 3.4–5)
ALBUMIN/GLOB SERPL: 1.2 {RATIO} (ref 1–2.4)
ALP SERPL-CCNC: 80 UNITS/L (ref 45–117)
ALT SERPL-CCNC: 30 UNITS/L
ANION GAP SERPL CALC-SCNC: 14 MMOL/L (ref 7–19)
AST SERPL-CCNC: 18 UNITS/L
BILIRUB SERPL-MCNC: 0.9 MG/DL (ref 0.2–1)
BUN SERPL-MCNC: 20 MG/DL (ref 6–20)
BUN/CREAT SERPL: 23 (ref 7–25)
CALCIUM SERPL-MCNC: 9.6 MG/DL (ref 8.4–10.2)
CHLORIDE SERPL-SCNC: 104 MMOL/L (ref 97–110)
CHOLEST SERPL-MCNC: 127 MG/DL
CHOLEST/HDLC SERPL: 2.6 {RATIO}
CO2 SERPL-SCNC: 27 MMOL/L (ref 21–32)
CREAT SERPL-MCNC: 0.86 MG/DL (ref 0.67–1.17)
EGFRCR SERPLBLD CKD-EPI 2021: >90 ML/MIN/{1.73_M2}
FASTING DURATION TIME PATIENT: 12 HOURS (ref 0–999)
GLOBULIN SER-MCNC: 3.6 G/DL (ref 2–4)
GLUCOSE SERPL-MCNC: 84 MG/DL (ref 70–99)
HBA1C MFR BLD: 5.7 % (ref 4.5–5.6)
HDLC SERPL-MCNC: 48 MG/DL
LDLC SERPL CALC-MCNC: 54 MG/DL
NONHDLC SERPL-MCNC: 79 MG/DL
POTASSIUM SERPL-SCNC: 4.5 MMOL/L (ref 3.4–5.1)
PROT SERPL-MCNC: 7.9 G/DL (ref 6.4–8.2)
SODIUM SERPL-SCNC: 140 MMOL/L (ref 135–145)
TRIGL SERPL-MCNC: 125 MG/DL

## 2025-05-06 ENCOUNTER — HOSPITAL ENCOUNTER (OUTPATIENT)
Age: 40
Discharge: HOME | End: 2025-05-06
Payer: COMMERCIAL

## 2025-05-06 VITALS
OXYGEN SATURATION: 96 % | DIASTOLIC BLOOD PRESSURE: 72 MMHG | TEMPERATURE: 99 F | RESPIRATION RATE: 20 BRPM | HEART RATE: 104 BPM | SYSTOLIC BLOOD PRESSURE: 132 MMHG

## 2025-05-06 DIAGNOSIS — B34.9 VIRAL ILLNESS: Primary | ICD-10-CM

## 2025-05-06 DIAGNOSIS — R52 BODY ACHES: ICD-10-CM

## 2025-05-06 LAB
POCT INFLUENZA A: NEGATIVE
POCT INFLUENZA B: NEGATIVE
S PYO AG THROAT QL: NEGATIVE
SARS-COV-2 RNA RESP QL NAA+PROBE: NOT DETECTED

## 2025-05-06 PROCEDURE — 87880 STREP A ASSAY W/OPTIC: CPT | Performed by: NURSE PRACTITIONER

## 2025-05-06 PROCEDURE — 99213 OFFICE O/P EST LOW 20 MIN: CPT | Performed by: NURSE PRACTITIONER

## 2025-05-06 PROCEDURE — 87502 INFLUENZA DNA AMP PROBE: CPT | Performed by: NURSE PRACTITIONER

## 2025-05-06 PROCEDURE — U0002 COVID-19 LAB TEST NON-CDC: HCPCS | Performed by: NURSE PRACTITIONER

## 2025-05-06 RX ORDER — TIRZEPATIDE 5 MG/.5ML
INJECTION, SOLUTION SUBCUTANEOUS
COMMUNITY

## 2025-05-06 NOTE — ED PROVIDER NOTES
Patient Seen in: [facility]      History     Chief Complaint   Patient presents with    Sore Throat    Body ache and/or chills    Fatigue     Stated Complaint: sore throat    Subjective: This is a 39-year-old male with past medical history of anxiety, depression, allergic rhinitis, presents to immediate care clinic for sore throat, nasal congestion, body aches, chills, fatigue that started yesterday morning.  Patient is concerned about strep throat.  He does report he has children at home but they are not sick.  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  No recent travel.  He is without fever, headache, cough, runny nose.  No abdominal pain, nausea, vomiting, diarrhea.  Well-appearing.  AO x 4  The history is provided by the patient.         History of Present Illness               Objective:     Past Medical History:    Anxiety    Depression              History reviewed. No pertinent surgical history.             Social History     Socioeconomic History    Marital status:    Tobacco Use    Smoking status: Never     Passive exposure: Never    Smokeless tobacco: Never   Vaping Use    Vaping status: Never Used   Substance and Sexual Activity    Alcohol use: Not Currently    Drug use: Not Currently              Review of Systems   Constitutional:  Positive for chills and fatigue. Negative for activity change, appetite change and fever.   HENT:  Positive for congestion and sore throat. Negative for ear discharge, ear pain, postnasal drip, rhinorrhea, sinus pressure, sinus pain and sneezing.    Eyes: Negative.    Respiratory:  Negative for cough, chest tightness and wheezing.    Cardiovascular: Negative.    Gastrointestinal: Negative.    Musculoskeletal:  Positive for arthralgias and myalgias.   Skin: Negative.    Neurological: Negative.        Positive for stated complaint: sore throat  Other systems are as noted in HPI.  Constitutional and vital signs reviewed.      All other  systems reviewed and negative except as noted above.                  Physical Exam     ED Triage Vitals [05/06/25 1311]   /72   Pulse 104   Resp 20   Temp 99 °F (37.2 °C)   Temp src Oral   SpO2 96 %   O2 Device None (Room air)       Current Vitals:   Vital Signs  BP: 132/72  Pulse: 104  Resp: 20  Temp: 99 °F (37.2 °C)  Temp src: Oral    Oxygen Therapy  SpO2: 96 %  O2 Device: None (Room air)        Physical Exam  Constitutional:       Appearance: He is well-developed. He is not ill-appearing.   HENT:      Head: Normocephalic.      Jaw: There is normal jaw occlusion.      Right Ear: Tympanic membrane and ear canal normal.      Left Ear: Tympanic membrane and ear canal normal.      Nose: Congestion present. No rhinorrhea.      Mouth/Throat:      Lips: Pink. No lesions.      Mouth: Mucous membranes are moist. No oral lesions.      Dentition: No gum lesions.      Tongue: No lesions.      Palate: No lesions.      Pharynx: Uvula midline. Posterior oropharyngeal erythema present. No pharyngeal swelling, oropharyngeal exudate or uvula swelling.   Eyes:      Conjunctiva/sclera: Conjunctivae normal.      Pupils: Pupils are equal, round, and reactive to light.   Cardiovascular:      Rate and Rhythm: Normal rate and regular rhythm.      Heart sounds: Normal heart sounds. No murmur heard.  Pulmonary:      Effort: Pulmonary effort is normal. No respiratory distress.      Breath sounds: Normal breath sounds. No stridor. No wheezing, rhonchi or rales.   Chest:      Chest wall: No tenderness.   Musculoskeletal:      Cervical back: Normal range of motion.   Lymphadenopathy:      Cervical: Cervical adenopathy present.   Skin:     General: Skin is warm.      Capillary Refill: Capillary refill takes less than 2 seconds.   Neurological:      General: No focal deficit present.      Mental Status: He is alert and oriented to person, place, and time.           Physical Exam                ED Course     Labs Reviewed   POCT RAPID STREP  - Normal   POCT FLU TEST - Normal    Narrative:     This assay is a rapid molecular in vitro test utilizing nucleic acid amplification of influenza A and B viral RNA.   RAPID SARS-COV-2 BY PCR - Normal          Results                           MDM      Differentials considered include: Strep pharyngitis, viral pharyngitis, viral URI, COVID-19, influenza, allergic rhinitis.    Patient posterior pharynx with erythema.  No edema or exudate.  Uvula midline and normal in size.  Mucous membranes moist.  No intraoral lesions or petechiae.  Positive bilateral cervical lymphadenopathy.  Patient is tolerating his own secretions well without difficulty.  No excessive drooling, stridor, voice change.  No evidence of peritonsillar abscess.    Patient is negative for strep throat.    His lungs are clear on exam.  No evidence of wheezing, rhonchi, coarse or diminished breath sounds, rales.  No evidence of pneumonia or bronchitis.    Patient is negative for COVID-19 and influenza.    Symptoms are likely viral versus allergic rhinitis in appearance.  Patient is aware of supportive and symptomatic management at home.    Patient is aware if cough develops, cough may linger for 2 to 3 weeks.    He is aware if there is no improvement of symptoms the next 7 to 10 days to follow-up with his primary care doctor.      Medical Decision Making      Disposition and Plan     Clinical Impression:  1. Viral illness    2. Body aches         Disposition:  Discharge  5/6/2025  1:55 pm    Follow-up:  Clive Gracia MD  [address]  [address] [address]  [phone]    In 7 days  If symptoms worsen          Medications Prescribed:  Current Discharge Medication List          Supplementary Documentation:

## 2025-05-06 NOTE — DISCHARGE INSTRUCTIONS
As discussed, you are negative for COVID-19, influenza, strep throat.  Lungs are clear on exam, no pneumonia.    This may be the beginning of a respiratory virus.  Continue to monitor your symptoms.  If cough develops, likely viral in nature but you can expect cough to last for 2 to 3 weeks.    Drink plenty of water and get plenty of rest.  Sleep somewhat elevated and upright.  Sleep with humidifier.  Steam showers for cough and congestion.  2 teaspoons of honey at bedtime if you do develop a cough.    You may continue with Flonase and allergy medication.    Follow-up with your primary care doctor and/or return to immediate care if no improvement of symptoms in the next week - 10 days

## 2025-05-06 NOTE — ED INITIAL ASSESSMENT (HPI)
Pt c/o sore throat, body ache and fatigue. Pt states symptoms started last night. Pt concerned about strep.

## 2025-05-08 ENCOUNTER — HOSPITAL ENCOUNTER (OUTPATIENT)
Age: 40
Discharge: HOME | End: 2025-05-08
Payer: COMMERCIAL

## 2025-05-08 ENCOUNTER — APPOINTMENT (OUTPATIENT)
Dept: GENERAL RADIOLOGY | Age: 40
End: 2025-05-08
Attending: PHYSICIAN ASSISTANT
Payer: COMMERCIAL

## 2025-05-08 VITALS
SYSTOLIC BLOOD PRESSURE: 141 MMHG | RESPIRATION RATE: 20 BRPM | DIASTOLIC BLOOD PRESSURE: 61 MMHG | OXYGEN SATURATION: 97 % | TEMPERATURE: 101 F | HEART RATE: 100 BPM

## 2025-05-08 DIAGNOSIS — R50.9 FEBRILE ILLNESS: Primary | ICD-10-CM

## 2025-05-08 DIAGNOSIS — J02.9 SORE THROAT: ICD-10-CM

## 2025-05-08 LAB
POCT INFLUENZA A: NEGATIVE
POCT INFLUENZA B: NEGATIVE
S PYO AG THROAT QL IA.RAPID: NEGATIVE

## 2025-05-08 PROCEDURE — 99213 OFFICE O/P EST LOW 20 MIN: CPT

## 2025-05-08 PROCEDURE — 87502 INFLUENZA DNA AMP PROBE: CPT | Performed by: PHYSICIAN ASSISTANT

## 2025-05-08 PROCEDURE — 87651 STREP A DNA AMP PROBE: CPT | Performed by: PHYSICIAN ASSISTANT

## 2025-05-08 RX ORDER — DEXAMETHASONE SODIUM PHOSPHATE 10 MG/ML
10 INJECTION, SOLUTION INTRAMUSCULAR; INTRAVENOUS ONCE
Status: COMPLETED | OUTPATIENT
Start: 2025-05-08 | End: 2025-05-08

## 2025-05-08 NOTE — ED PROVIDER NOTES
Patient Seen in: [facility]      History     Chief Complaint   Patient presents with    Viral Syndrome     Stated Complaint: sore throat    Subjective:   HPI    39-year-old male with history of anxiety, depression presenting to the immediate care for evaluation of URI symptoms which started 2 days ago.  Patient reporting sore throat is the most bothersome symptom, dryness to the mouth, lips.  Additionally with poor appetite, fatigue and fever/chills.  He was seen at the onset of symptoms where flu, COVID and strep testing were negative.  He has been using OTC medications for the symptoms.  No complaint of chest pain or SOB.     History of Present Illness               Objective:     Past Medical History:    Anxiety    Depression              History reviewed. No pertinent surgical history.             Social History     Socioeconomic History    Marital status:    Tobacco Use    Smoking status: Never     Passive exposure: Never    Smokeless tobacco: Never   Vaping Use    Vaping status: Never Used   Substance and Sexual Activity    Alcohol use: Not Currently    Drug use: Not Currently              Review of Systems    Positive for stated complaint: sore throat  Other systems are as noted in HPI.  Constitutional and vital signs reviewed.      All other systems reviewed and negative except as noted above.                  Physical Exam     ED Triage Vitals [05/08/25 1030]   /61   Pulse 116   Resp 19   Temp (!) 101.1 °F (38.4 °C)   Temp src Oral   SpO2 97 %   O2 Device None (Room air)       Current Vitals:   Vital Signs  BP: 141/61  Pulse: 100  Resp: 20  Temp: (!) 101.1 °F (38.4 °C)  Temp src: Oral    Oxygen Therapy  SpO2: 97 %  O2 Device: None (Room air)        Physical Exam  Vitals and nursing note reviewed.   Constitutional:       General: He is not in acute distress.  HENT:      Head: Normocephalic and atraumatic.      Right Ear: External ear normal.      Left Ear: External ear normal.       Nose: Nose normal.      Mouth/Throat:      Mouth: Mucous membranes are moist.      Pharynx: Uvula midline.      Tonsils: No tonsillar exudate. 1+ on the right. 1+ on the left.   Eyes:      Extraocular Movements: Extraocular movements intact.      Pupils: Pupils are equal, round, and reactive to light.   Cardiovascular:      Rate and Rhythm: Normal rate.   Pulmonary:      Effort: Pulmonary effort is normal.      Breath sounds: No wheezing.   Abdominal:      General: Abdomen is flat.   Musculoskeletal:         General: Normal range of motion.      Cervical back: Normal range of motion.   Skin:     General: Skin is warm.   Neurological:      General: No focal deficit present.      Mental Status: He is alert and oriented to person, place, and time.   Psychiatric:         Mood and Affect: Mood normal.         Behavior: Behavior normal.           Physical Exam                ED Course     Labs Reviewed   POCT FLU TEST - Normal    Narrative:     This assay is a rapid molecular in vitro test utilizing nucleic acid amplification of influenza A and B viral RNA.   RAPID STREP A - Normal        39-year-old male presents for evaluation of URI symptoms, fever.  Patient noted for fever 101.6, heart rate 116.  Posterior oropharynx erythematous, no significant edema or exudates.  Lungs clear with no wheezing or rhonchi    Ddx-strep, influenza, viral illness    Strep, influenza are again negative.  Patient's heart rate has improved since sitting.  I discussed with patient his symptoms are suggestive of a viral illness.  Will give Decadron for pain, swelling in the posterior oropharynx.  Advised to continue NSAIDs, Tylenol and supportive care.  He is staying well-hydrated and tolerating PO.  No cough, chest pain or SOB.  Results                           MDM              Medical Decision Making      Disposition and Plan     Clinical Impression:  1. Febrile illness    2. Sore throat         Disposition:  Discharge  5/8/2025 11:37  am    Follow-up:  Clive Gracia MD  [address]  [address]  [facility] [address]  [phone]                Medications Prescribed:  Discharge Medication List as of 5/8/2025 11:41 AM          Supplementary Documentation:

## 2025-05-08 NOTE — ED INITIAL ASSESSMENT (HPI)
Was seen on 5/6 for uri symptoms, states not feeling better, still with fever, decreased appetite ,fatigue, denies sob, no n/v/d

## 2025-05-31 ENCOUNTER — HOSPITAL ENCOUNTER (OUTPATIENT)
Age: 40
Discharge: HOME | End: 2025-05-31
Payer: COMMERCIAL

## 2025-05-31 VITALS
RESPIRATION RATE: 18 BRPM | DIASTOLIC BLOOD PRESSURE: 76 MMHG | HEART RATE: 108 BPM | OXYGEN SATURATION: 98 % | SYSTOLIC BLOOD PRESSURE: 144 MMHG

## 2025-05-31 DIAGNOSIS — B96.89 ACUTE BACTERIAL PHARYNGITIS: Primary | ICD-10-CM

## 2025-05-31 DIAGNOSIS — J02.8 ACUTE BACTERIAL PHARYNGITIS: Primary | ICD-10-CM

## 2025-05-31 PROCEDURE — 99213 OFFICE O/P EST LOW 20 MIN: CPT | Performed by: NURSE PRACTITIONER

## 2025-05-31 RX ORDER — AMOXICILLIN 875 MG/1
875 TABLET, COATED ORAL 2 TIMES DAILY
Qty: 20 TABLET | Refills: 0 | Status: SHIPPED | OUTPATIENT
Start: 2025-05-31 | End: 2025-06-10

## 2025-05-31 RX ORDER — PREDNISONE 20 MG/1
40 TABLET ORAL DAILY
Qty: 10 TABLET | Refills: 0 | Status: SHIPPED | OUTPATIENT
Start: 2025-05-31 | End: 2025-06-05

## 2025-05-31 NOTE — ED PROVIDER NOTES
Patient Seen in: [facility]        History  No chief complaint on file.    Stated Complaint: sore throat    Subjective:   39-year-old male presents for sore throat for 2 weeks.  Patient does use CPAP.  Reports that he has had increased pain to the back of his throat was concerned that he saw some redness.  Denies any shortness of breath or chest pain      Objective:     No pertinent past medical history.            No pertinent past surgical history.              No pertinent social history.            Review of Systems   Constitutional: Negative.    Respiratory: Negative.     Cardiovascular: Negative.    Gastrointestinal: Negative.    Skin: Negative.    Neurological: Negative.        Positive for stated complaint: sore throat  Other systems are as noted in HPI.  Constitutional and vital signs reviewed.      All other systems reviewed and negative except as noted above.                  Physical Exam    ED Triage Vitals [05/31/25 1114]   /76   Pulse 108   Resp 18   Temp    Temp src    SpO2 98 %   O2 Device None (Room air)       Current Vitals:   Vital Signs  BP: 144/76  Pulse: 108  Resp: 18    Oxygen Therapy  SpO2: 98 %  O2 Device: None (Room air)            Physical Exam  Vitals and nursing note reviewed.   Constitutional:       General: He is not in acute distress.  HENT:      Head: Normocephalic.      Mouth/Throat:      Pharynx: Pharyngeal swelling and posterior oropharyngeal erythema present.      Tonsils: 2+ on the right. 2+ on the left.   Cardiovascular:      Rate and Rhythm: Normal rate and regular rhythm.   Pulmonary:      Effort: Pulmonary effort is normal.   Musculoskeletal:         General: Normal range of motion.   Skin:     General: Skin is warm and dry.   Neurological:      General: No focal deficit present.      Mental Status: He is alert and oriented to person, place, and time.               ED Course  Labs Reviewed - No data to display     MDM     Medical Decision  Making  Pertinent Labs & Imaging studies reviewed. (See chart for details).  Patient coming in with sore throat.   Differential diagnosis includes strep pharyngitis, viral pharyngitis, tonsillar     Patient is comfortable with plan of care.     Overall Pt looks good. Non-toxic, well-hydrated and in no respiratory distress. Vital signs are reassuring. Exam is reassuring. I do not believe pt requires and additional diagnostic studies or intervention. I believe pt can be discharged home to continue evaluation as an outpatient. Follow-up provider given. Discharge instructions given and reviewed. Return for any problems. All understand and agrees with the plan.        Problems Addressed:  Acute bacterial pharyngitis: acute illness or injury    Risk  OTC drugs.  Prescription drug management.        Disposition and Plan     Clinical Impression:  1. Acute bacterial pharyngitis         Disposition:  Discharge  5/31/2025 11:40 am    Follow-up:  Clive Gracia MD  [address] [address]  [phone]                Medications Prescribed:  Discharge Medication List as of 5/31/2025 11:41 AM        START taking these medications    Details   amoxicillin 875 MG Oral Tab Take 1 tablet (875 mg total) by mouth 2 (two) times daily for 10 days., Normal, Disp-20 tablet, R-0                   Supplementary Documentation:

## 2025-06-28 ENCOUNTER — HOSPITAL ENCOUNTER (EMERGENCY)
Facility: HOSPITAL | Age: 40
Discharge: HOME | End: 2025-06-29
Payer: COMMERCIAL

## 2025-06-28 DIAGNOSIS — T50.905A ADVERSE EFFECT OF DRUG, INITIAL ENCOUNTER: Primary | ICD-10-CM

## 2025-06-28 LAB
ALBUMIN SERPL-MCNC: 5.8 G/DL (ref 3.2–4.8)
ALP LIVER SERPL-CCNC: 85 U/L (ref 45–117)
ALT SERPL-CCNC: 13 U/L (ref 10–49)
ANION GAP SERPL CALC-SCNC: 13 MMOL/L (ref 0–18)
AST SERPL-CCNC: 16 U/L (ref ?–34)
BILIRUB DIRECT SERPL-MCNC: 0.4 MG/DL (ref ?–0.3)
BILIRUB SERPL-MCNC: 1.2 MG/DL (ref 0.3–1.2)
BUN BLD-MCNC: 19 MG/DL (ref 9–23)
BUN/CREAT SERPL: 14.3 (ref 10–20)
CALCIUM BLD-MCNC: 9.7 MG/DL (ref 8.7–10.4)
CHLORIDE SERPL-SCNC: 102 MMOL/L (ref 98–112)
CO2 SERPL-SCNC: 15 MMOL/L (ref 21–32)
CREAT BLD-MCNC: 1.33 MG/DL (ref 0.7–1.3)
EGFRCR SERPLBLD CKD-EPI 2021: 70 ML/MIN/1.73M2 (ref 60–?)
GLUCOSE BLD-MCNC: 141 MG/DL (ref 70–99)
LIPASE SERPL-CCNC: 31 U/L (ref 12–53)
OSMOLALITY SERPL CALC.SUM OF ELEC: 275 MOSM/KG (ref 275–295)
POTASSIUM SERPL-SCNC: 4 MMOL/L (ref 3.5–5.1)
PROT SERPL-MCNC: 8.9 G/DL (ref 5.7–8.2)
SODIUM SERPL-SCNC: 130 MMOL/L (ref 136–145)

## 2025-06-28 PROCEDURE — 80076 HEPATIC FUNCTION PANEL: CPT | Performed by: NURSE PRACTITIONER

## 2025-06-28 PROCEDURE — 96360 HYDRATION IV INFUSION INIT: CPT

## 2025-06-28 PROCEDURE — 80048 BASIC METABOLIC PNL TOTAL CA: CPT

## 2025-06-28 PROCEDURE — 85060 BLOOD SMEAR INTERPRETATION: CPT

## 2025-06-28 PROCEDURE — 99284 EMERGENCY DEPT VISIT MOD MDM: CPT

## 2025-06-28 PROCEDURE — 96361 HYDRATE IV INFUSION ADD-ON: CPT

## 2025-06-28 PROCEDURE — 83690 ASSAY OF LIPASE: CPT | Performed by: NURSE PRACTITIONER

## 2025-06-28 PROCEDURE — 85025 COMPLETE CBC W/AUTO DIFF WBC: CPT

## 2025-06-29 VITALS
BODY MASS INDEX: 33.74 KG/M2 | RESPIRATION RATE: 21 BRPM | SYSTOLIC BLOOD PRESSURE: 146 MMHG | OXYGEN SATURATION: 99 % | WEIGHT: 215 LBS | HEIGHT: 67 IN | HEART RATE: 90 BPM | DIASTOLIC BLOOD PRESSURE: 81 MMHG | TEMPERATURE: 98 F

## 2025-06-29 RX ORDER — METOCLOPRAMIDE 10 MG/1
10 TABLET ORAL 3 TIMES DAILY PRN
Qty: 20 TABLET | Refills: 0 | Status: SHIPPED | OUTPATIENT
Start: 2025-06-29 | End: 2025-07-29

## 2025-06-29 NOTE — ED PROVIDER NOTES
Patient Seen in: [facility] Emergency Department        History  Chief Complaint   Patient presents with    Diarrhea     Stated Complaint: diarrhea    Subjective:   39yom w no chronic medical problems reports w c/o nausea, vomiting, diarrhea x 3 days. On Monjourao x 6 weeks. Went from 5mg to 7.5mg this week. 1 day after dose started having explosive diarrhea, vomiting, cramping. Better today. No chest pain. No dizziness. No weakness. No flank pain. No headaches.                       Objective:     Past Medical History:    Anxiety    Depression              History reviewed. No pertinent surgical history.             Social History     Socioeconomic History    Marital status:    Tobacco Use    Smoking status: Never     Passive exposure: Never    Smokeless tobacco: Never   Vaping Use    Vaping status: Never Used   Substance and Sexual Activity    Alcohol use: Not Currently    Drug use: Not Currently                                Physical Exam    ED Triage Vitals [06/28/25 2055]   BP (!) 144/95   Pulse (!) 126   Resp 21   Temp 98 °F (36.7 °C)   Temp src Oral   SpO2 99 %   O2 Device None (Room air)       Current Vitals:   Vital Signs  BP: 136/82  Pulse: 89  Resp: 21  Temp: 98 °F (36.7 °C)  Temp src: Oral  MAP (mmHg): 95    Oxygen Therapy  SpO2: 97 %  O2 Device: None (Room air)            Physical Exam  Vitals and nursing note reviewed.   Constitutional:       General: He is not in acute distress.     Appearance: He is well-developed.   HENT:      Head: Normocephalic and atraumatic.      Nose: Nose normal.      Mouth/Throat:      Mouth: Mucous membranes are moist.   Eyes:      Conjunctiva/sclera: Conjunctivae normal.      Pupils: Pupils are equal, round, and reactive to light.   Cardiovascular:      Rate and Rhythm: Normal rate and regular rhythm.      Heart sounds: Normal heart sounds.   Pulmonary:      Effort: Pulmonary effort is normal.      Breath sounds: Normal breath sounds.   Abdominal:       General: Bowel sounds are normal.      Palpations: Abdomen is soft.   Musculoskeletal:         General: No tenderness or deformity. Normal range of motion.      Cervical back: Normal range of motion and neck supple.   Skin:     General: Skin is warm and dry.      Capillary Refill: Capillary refill takes less than 2 seconds.      Findings: No rash.      Comments: Normal color   Neurological:      General: No focal deficit present.      Mental Status: He is alert and oriented to person, place, and time.      GCS: GCS eye subscore is 4. GCS verbal subscore is 5. GCS motor subscore is 6.      Cranial Nerves: No cranial nerve deficit.      Gait: Gait normal.                 ED Course  Labs Reviewed   CBC WITH DIFFERENTIAL WITH PLATELET - Abnormal; Notable for the following components:       Result Value    WBC 16.1 (*)     RBC 6.02 (*)     Neutrophil Absolute Prelim 13.07 (*)     All other components within normal limits   BASIC METABOLIC PANEL (8) - Abnormal; Notable for the following components:    Glucose 141 (*)     Sodium 130 (*)     CO2 15.0 (*)     Creatinine 1.33 (*)     All other components within normal limits   HEPATIC FUNCTION PANEL (7) - Abnormal; Notable for the following components:    Bilirubin, Direct 0.4 (*)     Total Protein 8.9 (*)     Albumin 5.8 (*)     All other components within normal limits   LIPASE - Normal   MD BLOOD SMEAR CONSULT                            MDM            Medical Decision Making  40yo/m w hx and exam as stated; n/v/d    Non toxic, stable  Tachycardia resolved w meds/fluids  No chest pain  Normal lfts, lipase  No flank pain  No urinary symptoms  Stable  Well appearing  Tolerating po    Suspect reaction to GLP-1      Amount and/or Complexity of Data Reviewed  Labs:  Decision-making details documented in ED Course.    Risk  OTC drugs.  Prescription drug management.        Disposition and Plan     Clinical Impression:  1. Adverse effect of drug, initial encounter          Disposition:  Discharge  6/29/2025 12:05 am    Follow-up:  Clive Gracia MD  [address]  [address] [address]  [phone]    Follow up in 2 day(s)            Medications Prescribed:  Current Discharge Medication List        START taking these medications    Details   metoclopramide 10 MG Oral Tab Take 1 tablet (10 mg total) by mouth 3 (three) times daily as needed.  Qty: 20 tablet, Refills: 0                   Supplementary Documentation:

## 2025-06-29 NOTE — ED INITIAL ASSESSMENT (HPI)
Pt to ed w/c of diarrhea x3 days. Per pt, he had a dose increase of mounjaro from 5mg to 7.5 mg last Tuesday. Pt states having diarrhea episodes every 1-2 hrs since Thursday. Pt reports taking anti diarrhea medication with no relief. Pt ambulatory to triage. Expresses concerns for dehydration.

## 2025-06-30 LAB
BASOPHILS # BLD AUTO: 0.03 X10(3) UL (ref 0–0.2)
BASOPHILS NFR BLD AUTO: 0.2 %
DEPRECATED RDW RBC AUTO: 39.5 FL (ref 35.1–46.3)
EOSINOPHIL # BLD AUTO: 0.1 X10(3) UL (ref 0–0.7)
EOSINOPHIL NFR BLD AUTO: 0.6 %
ERYTHROCYTE [DISTWIDTH] IN BLOOD BY AUTOMATED COUNT: 13.7 % (ref 11–15)
HCT VFR BLD AUTO: 48.4 % (ref 39–53)
HGB BLD-MCNC: 16.6 G/DL (ref 13–17.5)
IMM GRANULOCYTES # BLD AUTO: 0.06 X10(3) UL (ref 0–1)
IMM GRANULOCYTES NFR BLD: 0.4 %
LYMPHOCYTES # BLD AUTO: 1.88 X10(3) UL (ref 1–4)
LYMPHOCYTES NFR BLD AUTO: 11.7 %
MCH RBC QN AUTO: 27.6 PG (ref 26–34)
MCHC RBC AUTO-ENTMCNC: 34.3 G/DL (ref 31–37)
MCV RBC AUTO: 80.4 FL (ref 80–100)
MONOCYTES # BLD AUTO: 0.94 X10(3) UL (ref 0.1–1)
MONOCYTES NFR BLD AUTO: 5.8 %
NEUTROPHILS # BLD AUTO: 13.07 X10 (3) UL (ref 1.5–7.7)
NEUTROPHILS # BLD AUTO: 13.07 X10(3) UL (ref 1.5–7.7)
NEUTROPHILS NFR BLD AUTO: 81.3 %
PLATELET # BLD AUTO: 396 10(3)UL (ref 150–450)
RBC # BLD AUTO: 6.02 X10(6)UL (ref 4.3–5.7)
WBC # BLD AUTO: 16.1 X10(3) UL (ref 4–11)

## (undated) DIAGNOSIS — Z11.59 ENCOUNTER FOR SCREENING FOR OTHER VIRAL DISEASES: ICD-10-CM

## (undated) DIAGNOSIS — Z01.818 PREOP EXAMINATION: Primary | ICD-10-CM

## (undated) NOTE — LETTER
Date & Time: 5/8/2025, 11:36 AM  Patient: Varinder Stewart  Encounter Provider(s):    Elaine Baldwin APRN       To Whom It May Concern:    Varinder Stewart was seen and treated in our department on 5/6/2025.    If you have any questions or concerns, please do not hesitate to call.        _____________________________  Physician/APC Signature

## (undated) NOTE — Clinical Note
Eyad,  I met with Rain Draper today in clinic today for weight loss/management. I have recommended intensive lifestyle/behavioral modifications for weight loss. In addition, I have recommended low dose weight loss medication to help support his efforts. Please let me know if you have any questions or concerns. Thank you very much for referring your patient to our clinic.      Take good care,  THEODORA Konwles

## (undated) NOTE — LETTER
Date & Time: 5/8/2025, 11:35 AM  Patient: Varinder Stewart  Encounter Provider(s):    Mary Pichardo PA-C       To Whom It May Concern:    Varinder Stewart was seen and treated in our department on 5/8/2025. He should not return to work until 5/12/2025 .    If you have any questions or concerns, please do not hesitate to call.        _____________________________  Physician/APC Signature

## (undated) NOTE — LETTER
Date & Time: 5/8/2025, 11:35 AM  Patient: Varinder Stewart  Encounter Provider(s):    Mary Pichardo PA-C       To Whom It May Concern:    Varinder Stewart was seen and treated in our department on 5/8/2025. He should not return to work until 5/13/2025 .    If you have any questions or concerns, please do not hesitate to call.        _____________________________  Physician/APC Signature